# Patient Record
Sex: FEMALE | Race: WHITE | URBAN - METROPOLITAN AREA
[De-identification: names, ages, dates, MRNs, and addresses within clinical notes are randomized per-mention and may not be internally consistent; named-entity substitution may affect disease eponyms.]

---

## 2019-02-07 ENCOUNTER — EMERGENCY (EMERGENCY)
Facility: HOSPITAL | Age: 54
LOS: 0 days | Discharge: HOME | End: 2019-02-07
Attending: EMERGENCY MEDICINE | Admitting: EMERGENCY MEDICINE

## 2019-02-07 VITALS
RESPIRATION RATE: 18 BRPM | HEART RATE: 72 BPM | TEMPERATURE: 97 F | OXYGEN SATURATION: 99 % | SYSTOLIC BLOOD PRESSURE: 135 MMHG | DIASTOLIC BLOOD PRESSURE: 79 MMHG

## 2019-02-07 VITALS
DIASTOLIC BLOOD PRESSURE: 63 MMHG | TEMPERATURE: 98 F | RESPIRATION RATE: 20 BRPM | HEART RATE: 61 BPM | HEIGHT: 68 IN | SYSTOLIC BLOOD PRESSURE: 141 MMHG | WEIGHT: 179.9 LBS | OXYGEN SATURATION: 99 %

## 2019-02-07 DIAGNOSIS — R06.02 SHORTNESS OF BREATH: ICD-10-CM

## 2019-02-07 DIAGNOSIS — E03.9 HYPOTHYROIDISM, UNSPECIFIED: ICD-10-CM

## 2019-02-07 DIAGNOSIS — E78.5 HYPERLIPIDEMIA, UNSPECIFIED: ICD-10-CM

## 2019-02-07 DIAGNOSIS — R07.9 CHEST PAIN, UNSPECIFIED: ICD-10-CM

## 2019-02-07 DIAGNOSIS — Z88.0 ALLERGY STATUS TO PENICILLIN: ICD-10-CM

## 2019-02-07 DIAGNOSIS — R07.89 OTHER CHEST PAIN: ICD-10-CM

## 2019-02-07 LAB
ALBUMIN SERPL ELPH-MCNC: 4.2 G/DL — SIGNIFICANT CHANGE UP (ref 3.5–5.2)
ALP SERPL-CCNC: 88 U/L — SIGNIFICANT CHANGE UP (ref 30–115)
ALT FLD-CCNC: 36 U/L — SIGNIFICANT CHANGE UP (ref 0–41)
ANION GAP SERPL CALC-SCNC: 9 MMOL/L — SIGNIFICANT CHANGE UP (ref 7–14)
AST SERPL-CCNC: 24 U/L — SIGNIFICANT CHANGE UP (ref 0–41)
BASOPHILS # BLD AUTO: 0.05 K/UL — SIGNIFICANT CHANGE UP (ref 0–0.2)
BASOPHILS NFR BLD AUTO: 0.7 % — SIGNIFICANT CHANGE UP (ref 0–1)
BILIRUB SERPL-MCNC: 0.2 MG/DL — SIGNIFICANT CHANGE UP (ref 0.2–1.2)
BUN SERPL-MCNC: 15 MG/DL — SIGNIFICANT CHANGE UP (ref 10–20)
CALCIUM SERPL-MCNC: 9.1 MG/DL — SIGNIFICANT CHANGE UP (ref 8.5–10.1)
CHLORIDE SERPL-SCNC: 101 MMOL/L — SIGNIFICANT CHANGE UP (ref 98–110)
CO2 SERPL-SCNC: 32 MMOL/L — SIGNIFICANT CHANGE UP (ref 17–32)
CREAT SERPL-MCNC: 1.1 MG/DL — SIGNIFICANT CHANGE UP (ref 0.7–1.5)
D DIMER BLD IA.RAPID-MCNC: 135 NG/ML DDU — SIGNIFICANT CHANGE UP (ref 0–230)
EOSINOPHIL # BLD AUTO: 0.18 K/UL — SIGNIFICANT CHANGE UP (ref 0–0.7)
EOSINOPHIL NFR BLD AUTO: 2.6 % — SIGNIFICANT CHANGE UP (ref 0–8)
GLUCOSE SERPL-MCNC: 89 MG/DL — SIGNIFICANT CHANGE UP (ref 70–99)
HCT VFR BLD CALC: 41.5 % — SIGNIFICANT CHANGE UP (ref 37–47)
HGB BLD-MCNC: 13.7 G/DL — SIGNIFICANT CHANGE UP (ref 12–16)
IMM GRANULOCYTES NFR BLD AUTO: 0.1 % — SIGNIFICANT CHANGE UP (ref 0.1–0.3)
LIDOCAIN IGE QN: 37 U/L — SIGNIFICANT CHANGE UP (ref 7–60)
LYMPHOCYTES # BLD AUTO: 3.37 K/UL — SIGNIFICANT CHANGE UP (ref 1.2–3.4)
LYMPHOCYTES # BLD AUTO: 47.8 % — SIGNIFICANT CHANGE UP (ref 20.5–51.1)
MCHC RBC-ENTMCNC: 32.1 PG — HIGH (ref 27–31)
MCHC RBC-ENTMCNC: 33 G/DL — SIGNIFICANT CHANGE UP (ref 32–37)
MCV RBC AUTO: 97.2 FL — SIGNIFICANT CHANGE UP (ref 81–99)
MONOCYTES # BLD AUTO: 0.69 K/UL — HIGH (ref 0.1–0.6)
MONOCYTES NFR BLD AUTO: 9.8 % — HIGH (ref 1.7–9.3)
NEUTROPHILS # BLD AUTO: 2.75 K/UL — SIGNIFICANT CHANGE UP (ref 1.4–6.5)
NEUTROPHILS NFR BLD AUTO: 39 % — LOW (ref 42.2–75.2)
NRBC # BLD: 0 /100 WBCS — SIGNIFICANT CHANGE UP (ref 0–0)
PLATELET # BLD AUTO: 179 K/UL — SIGNIFICANT CHANGE UP (ref 130–400)
POTASSIUM SERPL-MCNC: 4.2 MMOL/L — SIGNIFICANT CHANGE UP (ref 3.5–5)
POTASSIUM SERPL-SCNC: 4.2 MMOL/L — SIGNIFICANT CHANGE UP (ref 3.5–5)
PROT SERPL-MCNC: 6.7 G/DL — SIGNIFICANT CHANGE UP (ref 6–8)
RBC # BLD: 4.27 M/UL — SIGNIFICANT CHANGE UP (ref 4.2–5.4)
RBC # FLD: 12.7 % — SIGNIFICANT CHANGE UP (ref 11.5–14.5)
SODIUM SERPL-SCNC: 142 MMOL/L — SIGNIFICANT CHANGE UP (ref 135–146)
TROPONIN T SERPL-MCNC: <0.01 NG/ML — SIGNIFICANT CHANGE UP
TROPONIN T SERPL-MCNC: <0.01 NG/ML — SIGNIFICANT CHANGE UP
WBC # BLD: 7.05 K/UL — SIGNIFICANT CHANGE UP (ref 4.8–10.8)
WBC # FLD AUTO: 7.05 K/UL — SIGNIFICANT CHANGE UP (ref 4.8–10.8)

## 2019-02-07 NOTE — ED PROVIDER NOTE - CARE PROVIDER_API CALL
Walker Spain)  Cardiovascular Disease; Internal Medicine  [address] [place] [id number]  Phone: [phone]  Fax: [phone]  Follow Up Time:

## 2019-02-07 NOTE — ED PROVIDER NOTE - PHYSICAL EXAMINATION
Alert well apperaing, CVS rrr, 2+ radial pulse b/l equal ,  RESP cta no w/r/r,  abd soft nontender no pulsatile abdominal mass,  no le edema.

## 2019-02-07 NOTE — ED PROVIDER NOTE - NS ED ROS FT
Constitutional:  No fever, chills, lethargy, or abnormal weight loss  Eyes:  No eye pain or visual changes  ENMT: No nasal discharge, no toothache, no sore throat. No neck pain or stiffness  Cardiac:  hpi  Respiratory:  No cough or respiratory distress.   GI:  No nausea, vomiting, diarrhea or abdominal pain.  :  No dysuria, frequency or burning.  MS:  No back or joint pain.  Neuro:  No headache. No numbness, weakness, or tingling.   Skin:  No skin rash  Except as documented in the HPI,  all other systems are negative

## 2019-02-07 NOTE — ED PROVIDER NOTE - OBJECTIVE STATEMENT
53yF pmhx HLD, hypothryoid,  pancreatitis  p/w chest pain retrosternal  x 2 weeks intermittnet -  started when she was driving back from [place]  - ,  over  past  4 days  has felt like a pressure/ lump  in her chest - - this morning -  3am  got worse -  occasional SOB - no syncope no leg pain swelling non smoker no cardiac hx no hx of  dvt pe

## 2019-02-07 NOTE — ED PROVIDER NOTE - INTERPRETATION
normal sinus rhythm, Normal axis, Normal CT interval and QRS complex. There are no acute ischemic ST or T-wave changes./sinus

## 2019-02-07 NOTE — ED PROVIDER NOTE - MEDICAL DECISION MAKING DETAILS
Pt with intermittent CP as above, feels it might be related to food, PE unremarkable, labs and studies reviewed, will d/c to f/u with pmd/cardio. Patient counseled regarding conditions which should prompt return.

## 2019-04-11 ENCOUNTER — EMERGENCY (EMERGENCY)
Facility: HOSPITAL | Age: 54
LOS: 0 days | Discharge: HOME | End: 2019-04-12
Attending: EMERGENCY MEDICINE | Admitting: EMERGENCY MEDICINE
Payer: MEDICAID

## 2019-04-11 VITALS
WEIGHT: 175.05 LBS | SYSTOLIC BLOOD PRESSURE: 140 MMHG | HEIGHT: 66 IN | HEART RATE: 101 BPM | DIASTOLIC BLOOD PRESSURE: 96 MMHG | TEMPERATURE: 96 F | OXYGEN SATURATION: 99 % | RESPIRATION RATE: 20 BRPM

## 2019-04-11 DIAGNOSIS — Z88.0 ALLERGY STATUS TO PENICILLIN: ICD-10-CM

## 2019-04-11 DIAGNOSIS — R10.9 UNSPECIFIED ABDOMINAL PAIN: ICD-10-CM

## 2019-04-11 DIAGNOSIS — I25.2 OLD MYOCARDIAL INFARCTION: ICD-10-CM

## 2019-04-11 DIAGNOSIS — Z79.899 OTHER LONG TERM (CURRENT) DRUG THERAPY: ICD-10-CM

## 2019-04-11 DIAGNOSIS — Z86.73 PERSONAL HISTORY OF TRANSIENT ISCHEMIC ATTACK (TIA), AND CEREBRAL INFARCTION WITHOUT RESIDUAL DEFICITS: ICD-10-CM

## 2019-04-11 DIAGNOSIS — N12 TUBULO-INTERSTITIAL NEPHRITIS, NOT SPECIFIED AS ACUTE OR CHRONIC: ICD-10-CM

## 2019-04-11 DIAGNOSIS — R56.9 UNSPECIFIED CONVULSIONS: ICD-10-CM

## 2019-04-11 DIAGNOSIS — Z90.49 ACQUIRED ABSENCE OF OTHER SPECIFIED PARTS OF DIGESTIVE TRACT: ICD-10-CM

## 2019-04-11 PROCEDURE — 99284 EMERGENCY DEPT VISIT MOD MDM: CPT | Mod: 25

## 2019-04-11 NOTE — ED ADULT TRIAGE NOTE - CHIEF COMPLAINT QUOTE
Patient states "I have 2 ovarian cysts and I think it might have burst. The pain is in my stomach going to my back for the past 2 hours."

## 2019-04-11 NOTE — ED ADULT NURSE NOTE - NSIMPLEMENTINTERV_GEN_ALL_ED
Implemented All Universal Safety Interventions:  [place] to call system. Call bell, personal items and telephone within reach. Instruct patient to call for assistance. Room bathroom lighting operational. Non-slip footwear when patient is off stretcher. Physically safe environment: no spills, clutter or unnecessary equipment. Stretcher in lowest position, wheels locked, appropriate side rails in place.

## 2019-04-11 NOTE — ED ADULT NURSE REASSESSMENT NOTE - NS ED NURSE REASSESS COMMENT FT1
Pt received from RN Rayna, alert and oriented times three, breathing spontaneous, unlabored without distress on room air, diffuse abd pain, epigastric burning and nausea, awaits MD hess

## 2019-04-12 VITALS
TEMPERATURE: 97 F | DIASTOLIC BLOOD PRESSURE: 73 MMHG | HEART RATE: 63 BPM | RESPIRATION RATE: 18 BRPM | OXYGEN SATURATION: 99 % | SYSTOLIC BLOOD PRESSURE: 100 MMHG

## 2019-04-12 PROBLEM — K80.00 CALCULUS OF GALLBLADDER WITH ACUTE CHOLECYSTITIS WITHOUT OBSTRUCTION: Chronic | Status: ACTIVE | Noted: 2019-02-07

## 2019-04-12 PROBLEM — K85.90 ACUTE PANCREATITIS WITHOUT NECROSIS OR INFECTION, UNSPECIFIED: Chronic | Status: ACTIVE | Noted: 2019-02-07

## 2019-04-12 PROBLEM — R56.9 UNSPECIFIED CONVULSIONS: Chronic | Status: ACTIVE | Noted: 2019-02-07

## 2019-04-12 LAB
ALBUMIN SERPL ELPH-MCNC: 4.5 G/DL — SIGNIFICANT CHANGE UP (ref 3.5–5.2)
ALP SERPL-CCNC: 92 U/L — SIGNIFICANT CHANGE UP (ref 30–115)
ALT FLD-CCNC: 46 U/L — HIGH (ref 0–41)
ANION GAP SERPL CALC-SCNC: 13 MMOL/L — SIGNIFICANT CHANGE UP (ref 7–14)
APPEARANCE UR: ABNORMAL
AST SERPL-CCNC: 37 U/L — SIGNIFICANT CHANGE UP (ref 0–41)
BACTERIA # UR AUTO: ABNORMAL
BILIRUB SERPL-MCNC: <0.2 MG/DL — SIGNIFICANT CHANGE UP (ref 0.2–1.2)
BILIRUB UR-MCNC: NEGATIVE — SIGNIFICANT CHANGE UP
BUN SERPL-MCNC: 17 MG/DL — SIGNIFICANT CHANGE UP (ref 10–20)
CALCIUM SERPL-MCNC: 9.2 MG/DL — SIGNIFICANT CHANGE UP (ref 8.5–10.1)
CHLORIDE SERPL-SCNC: 104 MMOL/L — SIGNIFICANT CHANGE UP (ref 98–110)
CO2 SERPL-SCNC: 22 MMOL/L — SIGNIFICANT CHANGE UP (ref 17–32)
COD CRY URNS QL: NEGATIVE — SIGNIFICANT CHANGE UP
COLOR SPEC: ABNORMAL
CREAT SERPL-MCNC: 1.2 MG/DL — SIGNIFICANT CHANGE UP (ref 0.7–1.5)
DIFF PNL FLD: ABNORMAL
EPI CELLS # UR: ABNORMAL /HPF
GLUCOSE SERPL-MCNC: 196 MG/DL — HIGH (ref 70–99)
GLUCOSE UR QL: 100 MG/DL
GRAN CASTS # UR COMP ASSIST: NEGATIVE — SIGNIFICANT CHANGE UP
HCT VFR BLD CALC: 40.1 % — SIGNIFICANT CHANGE UP (ref 37–47)
HGB BLD-MCNC: 13.2 G/DL — SIGNIFICANT CHANGE UP (ref 12–16)
HYALINE CASTS # UR AUTO: NEGATIVE — SIGNIFICANT CHANGE UP
KETONES UR-MCNC: NEGATIVE — SIGNIFICANT CHANGE UP
LEUKOCYTE ESTERASE UR-ACNC: SIGNIFICANT CHANGE UP
LIDOCAIN IGE QN: 187 U/L — HIGH (ref 7–60)
MCHC RBC-ENTMCNC: 32.1 PG — HIGH (ref 27–31)
MCHC RBC-ENTMCNC: 32.9 G/DL — SIGNIFICANT CHANGE UP (ref 32–37)
MCV RBC AUTO: 97.6 FL — SIGNIFICANT CHANGE UP (ref 81–99)
NITRITE UR-MCNC: POSITIVE
NRBC # BLD: 0 /100 WBCS — SIGNIFICANT CHANGE UP (ref 0–0)
NT-PROBNP SERPL-SCNC: <5 PG/ML — SIGNIFICANT CHANGE UP (ref 0–300)
PH UR: 6.5 — SIGNIFICANT CHANGE UP (ref 5–8)
PLATELET # BLD AUTO: 233 K/UL — SIGNIFICANT CHANGE UP (ref 130–400)
POTASSIUM SERPL-MCNC: 4.3 MMOL/L — SIGNIFICANT CHANGE UP (ref 3.5–5)
POTASSIUM SERPL-SCNC: 4.3 MMOL/L — SIGNIFICANT CHANGE UP (ref 3.5–5)
PROT SERPL-MCNC: 7.3 G/DL — SIGNIFICANT CHANGE UP (ref 6–8)
PROT UR-MCNC: 30 MG/DL
RBC # BLD: 4.11 M/UL — LOW (ref 4.2–5.4)
RBC # FLD: 12.8 % — SIGNIFICANT CHANGE UP (ref 11.5–14.5)
RBC CASTS # UR COMP ASSIST: ABNORMAL /HPF
SODIUM SERPL-SCNC: 139 MMOL/L — SIGNIFICANT CHANGE UP (ref 135–146)
SP GR SPEC: 1.01 — SIGNIFICANT CHANGE UP (ref 1.01–1.03)
TRI-PHOS CRY UR QL COMP ASSIST: NEGATIVE — SIGNIFICANT CHANGE UP
TROPONIN T SERPL-MCNC: <0.01 NG/ML — SIGNIFICANT CHANGE UP
URATE CRY FLD QL MICRO: NEGATIVE — SIGNIFICANT CHANGE UP
UROBILINOGEN FLD QL: 1 MG/DL (ref 0.2–0.2)
WBC # BLD: 9.19 K/UL — SIGNIFICANT CHANGE UP (ref 4.8–10.8)
WBC # FLD AUTO: 9.19 K/UL — SIGNIFICANT CHANGE UP (ref 4.8–10.8)
WBC UR QL: ABNORMAL /HPF

## 2019-04-12 PROCEDURE — 74176 CT ABD & PELVIS W/O CONTRAST: CPT | Mod: 26

## 2019-04-12 RX ORDER — KETOROLAC TROMETHAMINE 30 MG/ML
15 SYRINGE (ML) INJECTION ONCE
Qty: 0 | Refills: 0 | Status: DISCONTINUED | OUTPATIENT
Start: 2019-04-12 | End: 2019-04-12

## 2019-04-12 RX ORDER — CEFTRIAXONE 500 MG/1
1 INJECTION, POWDER, FOR SOLUTION INTRAMUSCULAR; INTRAVENOUS ONCE
Qty: 0 | Refills: 0 | Status: COMPLETED | OUTPATIENT
Start: 2019-04-12 | End: 2019-04-12

## 2019-04-12 RX ORDER — SODIUM CHLORIDE 9 MG/ML
1000 INJECTION, SOLUTION INTRAVENOUS ONCE
Qty: 0 | Refills: 0 | Status: COMPLETED | OUTPATIENT
Start: 2019-04-12 | End: 2019-04-12

## 2019-04-12 RX ORDER — CIPROFLOXACIN LACTATE 400MG/40ML
1 VIAL (ML) INTRAVENOUS
Qty: 20 | Refills: 0
Start: 2019-04-12 | End: 2019-04-21

## 2019-04-12 RX ORDER — ONDANSETRON 8 MG/1
4 TABLET, FILM COATED ORAL ONCE
Qty: 0 | Refills: 0 | Status: COMPLETED | OUTPATIENT
Start: 2019-04-12 | End: 2019-04-12

## 2019-04-12 RX ORDER — FAMOTIDINE 10 MG/ML
20 INJECTION INTRAVENOUS ONCE
Qty: 0 | Refills: 0 | Status: COMPLETED | OUTPATIENT
Start: 2019-04-12 | End: 2019-04-12

## 2019-04-12 RX ADMIN — SODIUM CHLORIDE 1000 MILLILITER(S): 9 INJECTION, SOLUTION INTRAVENOUS at 00:33

## 2019-04-12 RX ADMIN — FAMOTIDINE 20 MILLIGRAM(S): 10 INJECTION INTRAVENOUS at 00:36

## 2019-04-12 RX ADMIN — ONDANSETRON 4 MILLIGRAM(S): 8 TABLET, FILM COATED ORAL at 00:34

## 2019-04-12 RX ADMIN — CEFTRIAXONE 1 GRAM(S): 500 INJECTION, POWDER, FOR SOLUTION INTRAMUSCULAR; INTRAVENOUS at 05:10

## 2019-04-12 RX ADMIN — SODIUM CHLORIDE 1000 MILLILITER(S): 9 INJECTION, SOLUTION INTRAVENOUS at 01:30

## 2019-04-12 RX ADMIN — CEFTRIAXONE 100 GRAM(S): 500 INJECTION, POWDER, FOR SOLUTION INTRAMUSCULAR; INTRAVENOUS at 04:47

## 2019-04-12 RX ADMIN — Medication 15 MILLIGRAM(S): at 01:10

## 2019-04-12 RX ADMIN — Medication 15 MILLIGRAM(S): at 00:34

## 2019-04-12 NOTE — ED PROVIDER NOTE - NSFOLLOWUPCLINICS_GEN_ALL_ED_FT
[facility]  Urology  .  [place]   Phone: [phone]  Fax:   Follow Up Time: 7-10 Days

## 2019-04-12 NOTE — ED PROVIDER NOTE - OBJECTIVE STATEMENT
53yF with PMH TIA, silent MI X 4 per patient report, follows with Dr. Casarez, [facility] cholecystectomy, all PCN (swelling), p/w sudden onset aching moderate/severe suprapubic abd pain rad to both flanks a/w inability to urinate and burning sensation. pt took 1 dose of macrobid without relief and drank a bottle of cranberry juice with subsequent epigastric burning pain radiating up the sternum pt describes as heartburn. pt also has hx ovarian cyst bilaterally. +nausea

## 2019-04-12 NOTE — ED PROVIDER NOTE - ATTENDING CONTRIBUTION TO CARE
I personally evaluated the patient. I reviewed the Resident’s or Physician Assistant’s note (as assigned above), and agree with the findings and plan except as documented in my note.    53yF with PMH TIA, MI X 4 per patient report, follows with Dr. Casarez, [facility] cholecystectomy presents w lower abd plain and left sided flank pain. + dysuria for 3 days. No n/v. No CP, SOB.     CONSTITUTIONAL: Well-developed; well-nourished; in no acute distress. Sitting up and providing appropriate history and physical examination  SKIN: skin exam is warm and dry, no acute rash.  HEAD: Normocephalic; atraumatic.  EYES: PERRL, 3 mm bilateral, no nystagmus, EOM intact; conjunctiva and sclera clear.  ENT: No nasal discharge; airway clear.  NECK: Supple; non tender.+ full passive ROM in all directions. No JVD  CARD: S1, S2 normal; no murmurs, gallops, or rubs. Regular rate and rhythm. + Symmetric Strong Pulses  RESP: No wheezes, rales or rhonchi. Good air movement bilaterally  ABD: soft; non-distended; non-tender. No Rebound, No Gaurding, No signs of peritnitis, No CVA tenderness  EXT: Normal ROM. No clubbing, cyanosis or edema. Dp and Pt Pulses intact. Cap refill less than 3 seconds  NEURO: CN 2-12 intact, normal finger to nose, normal romberg, stable gait, no sensory or motor deficits, Alert, oriented, grossly unremarkable. No Focal deficits. GCS 15. NIH 0  PSYCH: Cooperative, appropriate.

## 2019-04-12 NOTE — ED PROVIDER NOTE - CLINICAL SUMMARY MEDICAL DECISION MAKING FREE TEXT BOX
abx ordered. pt well appearing on exam. will dc w pmd f/u Full DC instructions discussed and patient knows when to seek immediate medical attention. Patient has proper follow-up. All results discussed with the patient they may require further work-up. Limitations of ED work-up discussed. All  questions and concerns from patient or family addressed. Understanding of insturctions verbalized

## 2019-04-12 NOTE — ED PROVIDER NOTE - PHYSICAL EXAMINATION
Vital Signs: I have reviewed the initial vital signs.  Constitutional: NAD, well-nourished, appears stated age, no acute distress.  HEENT: Airway patent, moist MM, no erythema/swelling/deformity of oral structures. EOMI, PERRLA.  CV: regular rate, regular rhythm, well-perfused extremities, 2+ b/l DP and radial pulses equal.  Lungs: BCTA, no increased WOB.  ABD: discomofrt with epigastric and suprapubic palpation otherwise NTND, no guarding or rebound, no pulsatile mass, no hernias.   MSK: Neck supple, nontender, nl ROM, no stepoff. Chest nontender. Back nontender in TLS spine or to b/l bony structures. +BL CVA ttp. Ext nontender, nl rom, no deformity.   INTEG: Skin warm, dry, no rash.  NEURO: A&Ox3, moving all extremities, normal speech  PSYCH: Calm, cooperative, normal affect and interaction.

## 2019-04-12 NOTE — ED PROVIDER NOTE - NS ED ROS FT
Review of Systems    Constitutional: (-) fever  Cardiovascular: (-) chest pain, (-) syncope  Respiratory: (-) cough, (-) shortness of breath  Gastrointestinal: (-) vomiting, (-) diarrhea, (+) abdominal pain  Musculoskeletal: (-) neck pain, (+) back pain, (-) joint pain  Integumentary: (-) rash, (-) edema  Neurological: (-) headache, (-) altered mental status    Except as documented in the HPI, all other systems are negative.

## 2019-04-30 NOTE — ED PROVIDER NOTE - NS ED ATTENDING STATEMENT MOD
I have personally seen and examined this patient.  I have fully participated in the care of this patient. I have reviewed all pertinent clinical information, including history, physical exam, plan and the Resident’s note and agree except as noted. None

## 2019-05-20 ENCOUNTER — TRANSCRIPTION ENCOUNTER (OUTPATIENT)
Age: 54
End: 2019-05-20

## 2019-09-12 ENCOUNTER — TRANSCRIPTION ENCOUNTER (OUTPATIENT)
Age: 54
End: 2019-09-12

## 2019-09-14 ENCOUNTER — EMERGENCY (EMERGENCY)
Facility: HOSPITAL | Age: 54
LOS: 0 days | Discharge: HOME | End: 2019-09-14
Attending: EMERGENCY MEDICINE | Admitting: EMERGENCY MEDICINE
Payer: MEDICAID

## 2019-09-14 VITALS
DIASTOLIC BLOOD PRESSURE: 91 MMHG | SYSTOLIC BLOOD PRESSURE: 133 MMHG | HEART RATE: 85 BPM | OXYGEN SATURATION: 98 % | TEMPERATURE: 97 F | RESPIRATION RATE: 19 BRPM

## 2019-09-14 DIAGNOSIS — Z88.0 ALLERGY STATUS TO PENICILLIN: ICD-10-CM

## 2019-09-14 DIAGNOSIS — R39.89 OTHER SYMPTOMS AND SIGNS INVOLVING THE GENITOURINARY SYSTEM: ICD-10-CM

## 2019-09-14 DIAGNOSIS — N39.0 URINARY TRACT INFECTION, SITE NOT SPECIFIED: ICD-10-CM

## 2019-09-14 LAB
APPEARANCE UR: CLEAR — SIGNIFICANT CHANGE UP
BACTERIA # UR AUTO: ABNORMAL
BILIRUB UR-MCNC: ABNORMAL
COLOR SPEC: SIGNIFICANT CHANGE UP
DIFF PNL FLD: NEGATIVE — SIGNIFICANT CHANGE UP
EPI CELLS # UR: ABNORMAL /HPF
GLUCOSE UR QL: 100 MG/DL
KETONES UR-MCNC: NEGATIVE — SIGNIFICANT CHANGE UP
LEUKOCYTE ESTERASE UR-ACNC: NEGATIVE — SIGNIFICANT CHANGE UP
NITRITE UR-MCNC: POSITIVE
PH UR: 5 — SIGNIFICANT CHANGE UP (ref 5–8)
PROT UR-MCNC: 30 MG/DL
SP GR SPEC: 1.01 — SIGNIFICANT CHANGE UP (ref 1.01–1.03)
UROBILINOGEN FLD QL: 2 MG/DL (ref 0.2–0.2)
WBC UR QL: SIGNIFICANT CHANGE UP /HPF

## 2019-09-14 PROCEDURE — 99284 EMERGENCY DEPT VISIT MOD MDM: CPT

## 2019-09-14 RX ORDER — MOXIFLOXACIN HYDROCHLORIDE TABLETS, 400 MG 400 MG/1
1 TABLET, FILM COATED ORAL
Qty: 14 | Refills: 0
Start: 2019-09-14 | End: 2019-09-20

## 2019-09-14 RX ORDER — DIPHENHYDRAMINE HCL 50 MG
25 CAPSULE ORAL ONCE
Refills: 0 | Status: COMPLETED | OUTPATIENT
Start: 2019-09-14 | End: 2019-09-14

## 2019-09-14 RX ORDER — CEFTRIAXONE 500 MG/1
1000 INJECTION, POWDER, FOR SOLUTION INTRAMUSCULAR; INTRAVENOUS ONCE
Refills: 0 | Status: COMPLETED | OUTPATIENT
Start: 2019-09-14 | End: 2019-09-14

## 2019-09-14 RX ORDER — KETOROLAC TROMETHAMINE 30 MG/ML
15 SYRINGE (ML) INJECTION ONCE
Refills: 0 | Status: DISCONTINUED | OUTPATIENT
Start: 2019-09-14 | End: 2019-09-14

## 2019-09-14 RX ADMIN — Medication 15 MILLIGRAM(S): at 09:11

## 2019-09-14 RX ADMIN — CEFTRIAXONE 100 MILLIGRAM(S): 500 INJECTION, POWDER, FOR SOLUTION INTRAMUSCULAR; INTRAVENOUS at 09:11

## 2019-09-14 RX ADMIN — Medication 25 MILLIGRAM(S): at 09:41

## 2019-09-14 NOTE — ED ADULT NURSE NOTE - NSIMPLEMENTINTERV_GEN_ALL_ED
Implemented All Universal Safety Interventions:  [place] to call system. Call bell, personal items and telephone within reach. Instruct patient to call for assistance. Room bathroom lighting operational. Non-slip footwear when patient is off stretcher. Physically safe environment: no spills, clutter or unnecessary equipment. Stretcher in lowest position, wheels locked, appropriate side rails in place.

## 2019-09-14 NOTE — ED PROVIDER NOTE - ATTENDING CONTRIBUTION TO CARE
I personally evaluated patient. I agree with the findings and plan with all documentation on chart except as documented  in my note.    54 y/o F PMH hypothyroid, recurrent UTI c/o UTI symptoms for 1 week. Patient states symptoms started after being powdered and waxed. Symptoms got worse after having sex for the first time in 5 years. + protection use . + dysuria and frequency. + lower abdomen and right back pains. No fever or chills. Patient went to [facility] and was started on Macrobid and Pyridium without improvement.    On exam, VS reviewed. Patient has no CVA tenderness and is non-toxic appearing.  Patient spoken to and plan made to treat with one dose of Ceftriaxone and to DC on Cipro.  This combination has worked multiple times in the past. Shared decision making utilized and will DC with PO antibiotics and strict return precautions.    Full DC instructions discussed and patient knows when to seek immediate medical attention.  Patient has proper follow up.  All results discussed and patient aware they may require further work up.  Proper follow up ensured. Limitations of ED work up discussed.  Medications administered and prescribed/OTC home meds discussed.  All questions and concerns from patient or family addressed. Understanding of instructions verbalized.

## 2019-09-14 NOTE — ED PROVIDER NOTE - CLINICAL SUMMARY MEDICAL DECISION MAKING FREE TEXT BOX
On exam, VS reviewed. Patient has no CVA tenderness and is non-toxic appearing.  Patient spoken to and plan made to treat with one dose of Ceftriaxone and to DC on Cipro.  This combination has worked multiple times in the past. Shared decision making utilized and will DC with PO antibiotics and strict return precautions.    Full DC instructions discussed and patient knows when to seek immediate medical attention.  Patient has proper follow up.  All results discussed and patient aware they may require further work up.  Proper follow up ensured. Limitations of ED work up discussed.  Medications administered and prescribed/OTC home meds discussed.  All questions and concerns from patient or family addressed. Understanding of instructions verbalized.

## 2019-09-14 NOTE — ED PROVIDER NOTE - OBJECTIVE STATEMENT
52 yo F hx of hypothyroid, recurrent UTI c/o UTI symptoms for 1 week. Patient states symptoms started after being powdered and waxed. Symptoms got worse after having sex for the first time in 5 years. + protection use . + dysuria and frequency. + lower abdomen and right back pains. No fever or chills. Patient went to [facility] and was started on Macrobid and Pyridium without improvement.

## 2019-09-14 NOTE — ED PROVIDER NOTE - PROGRESS NOTE DETAILS
Patient refused pelvic exam. Denies vaginal discharge Patient had rash after Ceftriaxone, Benadryl given with improvement. No rash now. Patient reports she has hx of ovarian cyst. Pelvic sono offered, refused. She will f/u with her PMD/GYN

## 2019-09-14 NOTE — ED PROVIDER NOTE - PHYSICAL EXAMINATION
Gen: Alert, NAD, well appearing  Head: NC, AT, PERRL, EOMI, normal lids/conjunctiva  ENT: normal hearing, patent oropharynx   Neck: +supple, no tenderness/meningismus,  Pulm: Bilateral BS, normal resp effort, no wheeze/stridor/retractions  CV: RRR, no murmer  Abd: soft, +right CVA and suprapubic discomfort  Mskel: no edema/erythema/cyanosis  Skin: no rash, warm/dry  Neuro: AAOx3, no sensory/motor deficits

## 2019-09-14 NOTE — ED PROVIDER NOTE - NS ED ROS FT
Review of Systems    Constitutional: (-) fever, (-) chills  Eyes/ENT: (-) blurry vision, (-) epistaxis, (-) sore throat  Cardiovascular: (-) chest pain, (-) syncope  Respiratory: (-) cough, (-) shortness of breath  Gastrointestinal: (+) pain, (+) nausea, (-) vomiting, (-) diarrhea  Musculoskeletal: (-) neck pain, (+) back pain, (-) joint pain  Integumentary: (-) rash, (-) edema  Neurological: (-) headache, (-) altered mental status

## 2019-09-14 NOTE — ED PROVIDER NOTE - PATIENT PORTAL LINK FT
You can access the FollowMyHealth Patient Portal offered by [facility] by registering at the following website: [email or web address]. By joining [employer]’s FollowMyHealth portal, you will also be able to view your health information using other applications (apps) compatible with our system.

## 2019-09-15 LAB
CULTURE RESULTS: NO GROWTH — SIGNIFICANT CHANGE UP
SPECIMEN SOURCE: SIGNIFICANT CHANGE UP

## 2019-09-24 NOTE — ED ADULT NURSE NOTE - TEMPLATE LIST FOR HEAD TO TOE ASSESSMENT
Abdominal Pain, N/V/D Topical Clindamycin Counseling: Patient counseled that this medication may cause skin irritation or allergic reactions.  In the event of skin irritation, the patient was advised to reduce the amount of the drug applied or use it less frequently.   The patient verbalized understanding of the proper use and possible adverse effects of clindamycin.  All of the patient's questions and concerns were addressed.

## 2019-12-12 PROBLEM — Z00.00 ENCOUNTER FOR PREVENTIVE HEALTH EXAMINATION: Status: ACTIVE | Noted: 2019-12-12

## 2020-05-03 ENCOUNTER — TRANSCRIPTION ENCOUNTER (OUTPATIENT)
Age: 55
End: 2020-05-03

## 2021-02-02 NOTE — ED PROVIDER NOTE - CARE PLAN
[facility] form was placed on [place] NP desk to process
Principal Discharge DX:	Chest pain, unspecified type

## 2021-05-31 NOTE — ED ADULT NURSE NOTE - NSFALLRSKOUTCOME_ED_ALL_ED
Attempted to reschedule phone visit today as SW is out.  Line was busy x2 and then rang the third time with no vm.  Will remove patient from Schedule at this time.  Unsure the reason for appointment. No RN Note in chart to review from 7/25 visit  
Universal Safety Interventions

## 2021-09-18 ENCOUNTER — EMERGENCY (EMERGENCY)
Facility: HOSPITAL | Age: 56
LOS: 0 days | Discharge: HOME | End: 2021-09-18
Attending: EMERGENCY MEDICINE | Admitting: EMERGENCY MEDICINE
Payer: MEDICAID

## 2021-09-18 VITALS
SYSTOLIC BLOOD PRESSURE: 140 MMHG | DIASTOLIC BLOOD PRESSURE: 97 MMHG | OXYGEN SATURATION: 100 % | RESPIRATION RATE: 18 BRPM | HEART RATE: 61 BPM | TEMPERATURE: 98 F

## 2021-09-18 VITALS
HEIGHT: 66 IN | OXYGEN SATURATION: 97 % | RESPIRATION RATE: 18 BRPM | SYSTOLIC BLOOD PRESSURE: 155 MMHG | DIASTOLIC BLOOD PRESSURE: 108 MMHG | TEMPERATURE: 97 F | WEIGHT: 179.9 LBS | HEART RATE: 60 BPM

## 2021-09-18 DIAGNOSIS — I10 ESSENTIAL (PRIMARY) HYPERTENSION: ICD-10-CM

## 2021-09-18 DIAGNOSIS — R00.1 BRADYCARDIA, UNSPECIFIED: ICD-10-CM

## 2021-09-18 DIAGNOSIS — Z20.822 CONTACT WITH AND (SUSPECTED) EXPOSURE TO COVID-19: ICD-10-CM

## 2021-09-18 DIAGNOSIS — Z88.8 ALLERGY STATUS TO OTHER DRUGS, MEDICAMENTS AND BIOLOGICAL SUBSTANCES STATUS: ICD-10-CM

## 2021-09-18 DIAGNOSIS — Z91.013 ALLERGY TO SEAFOOD: ICD-10-CM

## 2021-09-18 DIAGNOSIS — R50.9 FEVER, UNSPECIFIED: ICD-10-CM

## 2021-09-18 DIAGNOSIS — R51.9 HEADACHE, UNSPECIFIED: ICD-10-CM

## 2021-09-18 DIAGNOSIS — Z79.890 HORMONE REPLACEMENT THERAPY: ICD-10-CM

## 2021-09-18 DIAGNOSIS — E11.9 TYPE 2 DIABETES MELLITUS WITHOUT COMPLICATIONS: ICD-10-CM

## 2021-09-18 DIAGNOSIS — R29.810 FACIAL WEAKNESS: ICD-10-CM

## 2021-09-18 DIAGNOSIS — M79.10 MYALGIA, UNSPECIFIED SITE: ICD-10-CM

## 2021-09-18 DIAGNOSIS — Z79.899 OTHER LONG TERM (CURRENT) DRUG THERAPY: ICD-10-CM

## 2021-09-18 DIAGNOSIS — Z88.0 ALLERGY STATUS TO PENICILLIN: ICD-10-CM

## 2021-09-18 DIAGNOSIS — Z87.19 PERSONAL HISTORY OF OTHER DISEASES OF THE DIGESTIVE SYSTEM: ICD-10-CM

## 2021-09-18 DIAGNOSIS — E03.9 HYPOTHYROIDISM, UNSPECIFIED: ICD-10-CM

## 2021-09-18 DIAGNOSIS — Z86.69 PERSONAL HISTORY OF OTHER DISEASES OF THE NERVOUS SYSTEM AND SENSE ORGANS: ICD-10-CM

## 2021-09-18 LAB
ALBUMIN SERPL ELPH-MCNC: 4.7 G/DL — SIGNIFICANT CHANGE UP (ref 3.5–5.2)
ALP SERPL-CCNC: 73 U/L — SIGNIFICANT CHANGE UP (ref 30–115)
ALT FLD-CCNC: 52 U/L — HIGH (ref 0–41)
ANION GAP SERPL CALC-SCNC: 11 MMOL/L — SIGNIFICANT CHANGE UP (ref 7–14)
APPEARANCE UR: CLEAR — SIGNIFICANT CHANGE UP
APTT BLD: 35.1 SEC — SIGNIFICANT CHANGE UP (ref 27–39.2)
AST SERPL-CCNC: 45 U/L — HIGH (ref 0–41)
BASOPHILS # BLD AUTO: 0.08 K/UL — SIGNIFICANT CHANGE UP (ref 0–0.2)
BASOPHILS NFR BLD AUTO: 1.1 % — HIGH (ref 0–1)
BILIRUB SERPL-MCNC: 0.2 MG/DL — SIGNIFICANT CHANGE UP (ref 0.2–1.2)
BILIRUB UR-MCNC: NEGATIVE — SIGNIFICANT CHANGE UP
BUN SERPL-MCNC: 16 MG/DL — SIGNIFICANT CHANGE UP (ref 10–20)
CALCIUM SERPL-MCNC: 9 MG/DL — SIGNIFICANT CHANGE UP (ref 8.5–10.1)
CHLORIDE SERPL-SCNC: 100 MMOL/L — SIGNIFICANT CHANGE UP (ref 98–110)
CO2 SERPL-SCNC: 25 MMOL/L — SIGNIFICANT CHANGE UP (ref 17–32)
COLOR SPEC: YELLOW — SIGNIFICANT CHANGE UP
CREAT SERPL-MCNC: 1 MG/DL — SIGNIFICANT CHANGE UP (ref 0.7–1.5)
DIFF PNL FLD: NEGATIVE — SIGNIFICANT CHANGE UP
EOSINOPHIL # BLD AUTO: 0.21 K/UL — SIGNIFICANT CHANGE UP (ref 0–0.7)
EOSINOPHIL NFR BLD AUTO: 3 % — SIGNIFICANT CHANGE UP (ref 0–8)
GLUCOSE SERPL-MCNC: 93 MG/DL — SIGNIFICANT CHANGE UP (ref 70–99)
GLUCOSE UR QL: NEGATIVE MG/DL — SIGNIFICANT CHANGE UP
HCG SERPL QL: NEGATIVE — SIGNIFICANT CHANGE UP
HCT VFR BLD CALC: 41 % — SIGNIFICANT CHANGE UP (ref 37–47)
HGB BLD-MCNC: 13.7 G/DL — SIGNIFICANT CHANGE UP (ref 12–16)
IMM GRANULOCYTES NFR BLD AUTO: 0.6 % — HIGH (ref 0.1–0.3)
INR BLD: 0.97 RATIO — SIGNIFICANT CHANGE UP (ref 0.65–1.3)
KETONES UR-MCNC: NEGATIVE — SIGNIFICANT CHANGE UP
LEUKOCYTE ESTERASE UR-ACNC: NEGATIVE — SIGNIFICANT CHANGE UP
LYMPHOCYTES # BLD AUTO: 2.71 K/UL — SIGNIFICANT CHANGE UP (ref 1.2–3.4)
LYMPHOCYTES # BLD AUTO: 38.3 % — SIGNIFICANT CHANGE UP (ref 20.5–51.1)
MCHC RBC-ENTMCNC: 33.2 PG — HIGH (ref 27–31)
MCHC RBC-ENTMCNC: 33.4 G/DL — SIGNIFICANT CHANGE UP (ref 32–37)
MCV RBC AUTO: 99.3 FL — HIGH (ref 81–99)
MONOCYTES # BLD AUTO: 0.49 K/UL — SIGNIFICANT CHANGE UP (ref 0.1–0.6)
MONOCYTES NFR BLD AUTO: 6.9 % — SIGNIFICANT CHANGE UP (ref 1.7–9.3)
NEUTROPHILS # BLD AUTO: 3.54 K/UL — SIGNIFICANT CHANGE UP (ref 1.4–6.5)
NEUTROPHILS NFR BLD AUTO: 50.1 % — SIGNIFICANT CHANGE UP (ref 42.2–75.2)
NITRITE UR-MCNC: NEGATIVE — SIGNIFICANT CHANGE UP
NRBC # BLD: 0 /100 WBCS — SIGNIFICANT CHANGE UP (ref 0–0)
PH UR: 6 — SIGNIFICANT CHANGE UP (ref 5–8)
PLATELET # BLD AUTO: 168 K/UL — SIGNIFICANT CHANGE UP (ref 130–400)
POTASSIUM SERPL-MCNC: 4.5 MMOL/L — SIGNIFICANT CHANGE UP (ref 3.5–5)
POTASSIUM SERPL-SCNC: 4.5 MMOL/L — SIGNIFICANT CHANGE UP (ref 3.5–5)
PROT SERPL-MCNC: 6.9 G/DL — SIGNIFICANT CHANGE UP (ref 6–8)
PROT UR-MCNC: NEGATIVE MG/DL — SIGNIFICANT CHANGE UP
PROTHROM AB SERPL-ACNC: 11.2 SEC — SIGNIFICANT CHANGE UP (ref 9.95–12.87)
RAPID RVP RESULT: SIGNIFICANT CHANGE UP
RBC # BLD: 4.13 M/UL — LOW (ref 4.2–5.4)
RBC # FLD: 13.4 % — SIGNIFICANT CHANGE UP (ref 11.5–14.5)
SARS-COV-2 RNA SPEC QL NAA+PROBE: SIGNIFICANT CHANGE UP
SODIUM SERPL-SCNC: 136 MMOL/L — SIGNIFICANT CHANGE UP (ref 135–146)
SP GR SPEC: 1.01 — SIGNIFICANT CHANGE UP (ref 1.01–1.03)
UROBILINOGEN FLD QL: 0.2 MG/DL — SIGNIFICANT CHANGE UP
WBC # BLD: 7.07 K/UL — SIGNIFICANT CHANGE UP (ref 4.8–10.8)
WBC # FLD AUTO: 7.07 K/UL — SIGNIFICANT CHANGE UP (ref 4.8–10.8)

## 2021-09-18 PROCEDURE — 99285 EMERGENCY DEPT VISIT HI MDM: CPT

## 2021-09-18 PROCEDURE — 70450 CT HEAD/BRAIN W/O DYE: CPT | Mod: 26,MA

## 2021-09-18 PROCEDURE — 93010 ELECTROCARDIOGRAM REPORT: CPT

## 2021-09-18 PROCEDURE — 70491 CT SOFT TISSUE NECK W/DYE: CPT | Mod: 26,MA

## 2021-09-18 PROCEDURE — 71046 X-RAY EXAM CHEST 2 VIEWS: CPT | Mod: 26

## 2021-09-18 RX ORDER — KETOROLAC TROMETHAMINE 30 MG/ML
15 SYRINGE (ML) INJECTION ONCE
Refills: 0 | Status: DISCONTINUED | OUTPATIENT
Start: 2021-09-18 | End: 2021-09-18

## 2021-09-18 RX ORDER — LEVOTHYROXINE SODIUM 125 MCG
1 TABLET ORAL
Qty: 0 | Refills: 0 | DISCHARGE

## 2021-09-18 RX ORDER — SUMATRIPTAN SUCCINATE 4 MG/.5ML
6 INJECTION, SOLUTION SUBCUTANEOUS ONCE
Refills: 0 | Status: COMPLETED | OUTPATIENT
Start: 2021-09-18 | End: 2021-09-18

## 2021-09-18 RX ORDER — SODIUM CHLORIDE 9 MG/ML
2500 INJECTION INTRAMUSCULAR; INTRAVENOUS; SUBCUTANEOUS ONCE
Refills: 0 | Status: COMPLETED | OUTPATIENT
Start: 2021-09-18 | End: 2021-09-18

## 2021-09-18 RX ORDER — DIPHENHYDRAMINE HCL 50 MG
50 CAPSULE ORAL ONCE
Refills: 0 | Status: COMPLETED | OUTPATIENT
Start: 2021-09-18 | End: 2021-09-18

## 2021-09-18 RX ORDER — ZOLPIDEM TARTRATE 10 MG/1
0 TABLET ORAL
Qty: 0 | Refills: 0 | DISCHARGE

## 2021-09-18 RX ORDER — METOCLOPRAMIDE HCL 10 MG
10 TABLET ORAL ONCE
Refills: 0 | Status: COMPLETED | OUTPATIENT
Start: 2021-09-18 | End: 2021-09-18

## 2021-09-18 RX ADMIN — Medication 50 MILLIGRAM(S): at 10:25

## 2021-09-18 RX ADMIN — SODIUM CHLORIDE 2500 MILLILITER(S): 9 INJECTION INTRAMUSCULAR; INTRAVENOUS; SUBCUTANEOUS at 10:26

## 2021-09-18 RX ADMIN — SUMATRIPTAN SUCCINATE 6 MILLIGRAM(S): 4 INJECTION, SOLUTION SUBCUTANEOUS at 13:04

## 2021-09-18 RX ADMIN — Medication 15 MILLIGRAM(S): at 10:00

## 2021-09-18 RX ADMIN — Medication 10 MILLIGRAM(S): at 10:25

## 2021-09-18 RX ADMIN — Medication 15 MILLIGRAM(S): at 10:40

## 2021-09-18 NOTE — ED PROVIDER NOTE - NS ED ROS FT
Eyes:  No visual changes, eye pain or discharge.  ENMT:  No hearing changes, pain, discharge or infections. No neck pain or stiffness.  Cardiac:  No chest pain, SOB   Respiratory:  No cough or respiratory distress. No hemoptysis. No history of asthma or RAD.  GI:  No nausea, vomiting, diarrhea or abdominal pain.  :  No dysuria, frequency or burning.  MS:  No myalgia, muscle weakness, joint pain or back pain.  Neuro:  + HA No weakness.  No LOC.  Skin:  No skin rash.   Endocrine: + dm No history of thyroid disease  Except as documented in the HPI,  all other systems are negative.

## 2021-09-18 NOTE — ED ADULT NURSE NOTE - NSICDXPASTMEDICALHX_GEN_ALL_CORE_FT
PAST MEDICAL HISTORY:  Gallbladder calculus with acute cholecystitis     Hypothyroid     Pancreatitis     Seizures

## 2021-09-18 NOTE — ED PROVIDER NOTE - PHYSICAL EXAMINATION
VITAL SIGNS: I have reviewed nursing notes and confirm.  CONSTITUTIONAL: Well-developed; well-nourished; in no acute distress.   SKIN:  skin exam is warm and dry, no acute rash.    HEAD: Normocephalic; atraumatic.  EYES:  conjunctiva and sclera clear.  ENT: No nasal discharge; airway clear.  CARD: S1, S2 normal; no murmurs, gallops, or rubs. Regular rate and rhythm.   RESP: No wheezes, rales or rhonchi.  ABD: Normal bowel sounds; soft; non-distended; non-tender  EXT: Normal ROM.  No clubbing, cyanosis or edema.   NEURO: Alert, oriented, grossly unremarkable

## 2021-09-18 NOTE — ED ADULT NURSE NOTE - NSFALLRSKASSESSDT_ED_ALL_ED
How Severe Is Your Skin Lesion?: moderate Have Your Skin Lesions Been Treated?: not been treated Is This A New Presentation, Or A Follow-Up?: Skin Lesion 18-Sep-2021 09:19

## 2021-09-18 NOTE — ED ADULT NURSE NOTE - NSIMPLEMENTINTERV_GEN_ALL_ED
From: Jimi Esposito  To: Brenton Jones LCSW  Sent: 10/27/2020 8:09 PM CDT  Subject: After-Visit Question    Joe, you mentioned that you would be sending me a link on couples?    Where do I find it?   Implemented All Universal Safety Interventions:  [place] to call system. Call bell, personal items and telephone within reach. Instruct patient to call for assistance. Room bathroom lighting operational. Non-slip footwear when patient is off stretcher. Physically safe environment: no spills, clutter or unnecessary equipment. Stretcher in lowest position, wheels locked, appropriate side rails in place.

## 2021-09-18 NOTE — ED PROVIDER NOTE - CLINICAL SUMMARY MEDICAL DECISION MAKING FREE TEXT BOX
56yo F history of HTN DM Hypothyroidism R sided bells palsy presenting with headache, myalgias, fevers/chills x4d. no photophobia, neck pain/stiffness. No numbness/focal weakness. no other acute complaints. Well appearing, NAD, non toxic. NCAT PERRLA EOMI neck supple non tender normal wob cta bl rrr abdomen s nt nd no rebound no guarding WWPx4 +R facial droop not sparing forehead otherwise neuro non focal. labs imaging reviewed. sp discussion with ent/vascular. Pt feeling improved. PERRLA, neck full ROM, no nuchal rigidity, baseline bells palsy otherwise CN2-12 grossly intact, no sensory or motor deficits throughout, no ataxia, no drift. Rapid alternating intact. Aware of all results, given a copy of all available results, comfortable with discharge and follow-up outpatient, strict return precautions given. Endorses understanding of all of this and aware that they can return at any time for new or concerning symptoms. No further questions or concerns at this time

## 2021-09-18 NOTE — ED PROVIDER NOTE - PROGRESS NOTE DETAILS
seen by vascular, likely artifactual given ct neck negative, pt made aware, will given pt f/u for neurology

## 2021-09-18 NOTE — ED PROVIDER NOTE - CARE PROVIDER_API CALL
Haja Aguayo)  Neurology  [address] [place] [address]  Phone: [phone]  Fax: [phone]  Follow Up Time:     Filemon Correa)  EEGEpilepsy; Neurology  [address] [place] [address], [address]  [place] [address]  Phone: [phone]  Fax: [phone]  Follow Up Time:

## 2021-09-18 NOTE — ED PROVIDER NOTE - PATIENT PORTAL LINK FT
You can access the FollowMyHealth Patient Portal offered by [facility] by registering at the following website: [email or web address]. By joining [email or web address]’s FollowMyHealth portal, you will also be able to view your health information using other applications (apps) compatible with our system.

## 2021-09-18 NOTE — ED PROVIDER NOTE - OBJECTIVE STATEMENT
Pt is a 54y/o female with a pmhx of DM, HTN, Hypothyroidism, bells palsy with residual facial droop,  presents today for eval of diffuse Headache x 4 days with associate jillian aches and subjective fever. Pt denies abd pain, CP, SOB, N/V/D, Neck pain, visual changes.

## 2021-09-18 NOTE — ED ADULT TRIAGE NOTE - CHIEF COMPLAINT QUOTE
As per patient four days ago I started getting a headache all over, then two days I got a fever 102 and I woke up with facial drooping on the right side, and my vision became blurry in both eyes"

## 2021-09-18 NOTE — CHART NOTE - NSCHARTNOTEFT_GEN_A_CORE
Called by [facility] ED PA.  Pt p/w headache, fever TMax 102F.   CTH with finding of "Indeterminant scattered foci of soft tissue air along the bilateral temporalis muscles and skull base, possibly within the bilateral internal carotid arteries."   Pt denies any recent procedure/ trauma.    < from: CT Head No Cont (09.18.21 @ 10:43) >  EXAM:  CT BRAIN        PROCEDURE DATE:  09/18/2021    INTERPRETATION:  CLINICAL INDICATION: Headache.    IMPRESSION:  Indeterminant scattered foci of soft tissue air along the bilateral temporalis muscles and skull base, possibly within the bilateral internal carotid arteries.    --- End of Report ---  GOLDIE GREEN MD; Resident Radiologist  This document has been electronically signed.  JESE FARRIS MD; Attending Radiologist  This document has been electronically signed. Sep 18 2021 11:07AM  < end of copied text >            Imaging reviewed with Dr. Argueta  Recommend Vascular evaluation for finding of air possibly w/in b/l internal carotid arteries.  Recommend CT Neck w/w/o con   d/w [facility] ED team.

## 2021-09-18 NOTE — CONSULT NOTE ADULT - ASSESSMENT
A 56 y/o female with a pmhx of DM, HTN, Hypothyroidism, headache,  bells palsy with residual facial droop,   presented to ED with headache x 4 days. On exam pt has right side bells palsy ,facial droop and decreased sensation unilaterally on right side forehead and cheek. Speech intact.  No focal deficit. CT head showed  Indeterminant scattered foci of soft tissue air along the bilateral temporalis muscles and skull base, possibly within the bilateral internal carotid and CT soft tissue does not show foci air most likely an artifact.    Plan:     No acute vascular surgical intervention   If continues to be a concern to the primary team, consult neurosurgery   Vascular singing off  Case discussed with vascular  fellow Dr. Fletcher who discussed with attending Dr. Garduno

## 2021-09-18 NOTE — CONSULT NOTE ADULT - SUBJECTIVE AND OBJECTIVE BOX
Vascular consult  note    Name  JOSE PATE    A 56 y/o female with a pmhx of DM, HTN, Hypothyroidism, headache,  bells palsy with residual facial droop,   presented to ED with headache x 4 days. Pt report headache started 4 days ago, initially on her right termporal region and now has spread to her whole scalp. Pt reports pain intermittent comes and goes but has progressively worsen. Pt reports similar headaches in the past. Last headache attack was 2014 and at that time she got bells palsy. Pt reports numbness and tingling on her right side of the face above the eyebrow and cheek.  Pt reports had fever 4 days ago 102. Pt reports blurry bilaterally, usually wears glasses. Pt reports light bothers her. Pt denies nausea or vomiting. Pt denies dizziness. Pt denies any focal weakness in upper or lower extremities. Pt denies neck pain.  Pt denies chest pain, shortness of breath, burning with urination, diarrhea.         Vital Signs Last 24 Hrs  T(C): 36.3 (18 Sep 2021 08:59), Max: 36.3 (18 Sep 2021 08:59)  T(F): 97.4 (18 Sep 2021 08:59), Max: 97.4 (18 Sep 2021 08:59)  HR: 60 (18 Sep 2021 08:59) (60 - 60)  BP: 155/108 (18 Sep 2021 08:59) (155/108 - 155/108)  RR: 18 (18 Sep 2021 08:59) (18 - 18)  SpO2: 97% (18 Sep 2021 08:59) (97% - 97%)  ICU Vital Signs Last 24 Hrs      Neurological Exam:   Mental status: Awake, alert and oriented x3.  Recent and remote memory intact.  Naming, repetition and comprehension intact.  Attention/concentration intact.  No dysarthria, Fund of knowledge appropriate.    Cranial nerves:  pupils equally round and reactive to light, visual fields full, no nystagmus, extraocular muscles intact,  face asymmetric, right side droop and right eye ptosis. tongue was midline, sternocleidomastoid/shoulder shrug strength bilaterally 5/5.  Neck  supple position ,  no rigidity.   No temporal side tenderness to palpation or with jaw movement. No scalp tenderness.   Motor:  Normal bulk and tone, strength 5/5 in bilateral upper and lower extremities.  No upper or lower extremities drift.    strength 5/5.  Rapid alternating movements intact and symmetric.   Sensation: right side of face forehead and cheek sensation decreased.   Coordination: No dysmetria on finger-to-nose and heel-to-shin.  No clumsiness.  Reflexes: 2+ in upper and lower extremities, downgoing toes bilaterally        Lab                        13.7   7.07  )-----------( 168      ( 18 Sep 2021 10:30 )             41.0     09-18    136  |  100  |  16  ----------------------------<  93  4.5   |  25  |  1.0    Ca    9.0      18 Sep 2021 10:30    TPro  6.9  /  Alb  4.7  /  TBili  0.2  /  DBili  x   /  AST  45<H>  /  ALT  52<H>  /  AlkPhos  73  09-18    CAPILLARY BLOOD GLUCOSE        LIVER FUNCTIONS - ( 18 Sep 2021 10:30 )  Alb: 4.7 g/dL / Pro: 6.9 g/dL / ALK PHOS: 73 U/L / ALT: 52 U/L / AST: 45 U/L / GGT: x           PT/INR - ( 18 Sep 2021 10:30 )   PT: 11.20 sec;   INR: 0.97 ratio         PTT - ( 18 Sep 2021 10:30 )  PTT:35.1 sec    Radiology  < from: CT Head No Cont (09.18.21 @ 10:43) >  IMPRESSION:    Indeterminant scattered foci of soft tissue air along the bilateral temporalis muscles and skull base, possibly within the bilateral internal carotid arteries.    --- End of Report ---      GOLDIE GREEN MD; Resident Radiologist  This document has been electronically signed.  JESE FARRIS MD; Attending Radiologist  This document has been electronically signed. Sep 18 2021 11:07AM    < end of copied text >      < from: CT Neck Soft Tissue w/ IV Cont (09.18.21 @ 15:00) >  IMPRESSION:    Unremarkable examination. Previously described punctate foci of soft tissue air is not demonstrated on this study.    --- End of Report ---      GOLDIE GREEN MD; Resident Radiologist  This document has been electronically signed.  JESE FARRIS MD; Attending Radiologist    < end of copied text >

## 2021-09-19 PROBLEM — E03.9 HYPOTHYROIDISM, UNSPECIFIED: Chronic | Status: ACTIVE | Noted: 2019-09-14

## 2021-09-22 RX ORDER — NITROFURANTOIN MACROCRYSTAL 50 MG
1 CAPSULE ORAL
Qty: 14 | Refills: 0
Start: 2021-09-22 | End: 2021-09-28

## 2021-09-24 LAB
CULTURE RESULTS: SIGNIFICANT CHANGE UP
CULTURE RESULTS: SIGNIFICANT CHANGE UP
SPECIMEN SOURCE: SIGNIFICANT CHANGE UP
SPECIMEN SOURCE: SIGNIFICANT CHANGE UP

## 2022-03-05 NOTE — ED ADULT NURSE NOTE - CCCP TRG CHIEF CMPLNT
This is a recent snapshot of the patient's [facility] medical record.  For current drug dose and complete information and questions, call [phone] or In Basket pool, [facility] ([id number])  CSN Number:  [id number]       abdominal pain

## 2022-12-26 ENCOUNTER — EMERGENCY (EMERGENCY)
Facility: HOSPITAL | Age: 57
LOS: 0 days | Discharge: HOME | End: 2022-12-26
Attending: EMERGENCY MEDICINE | Admitting: EMERGENCY MEDICINE

## 2022-12-26 VITALS
DIASTOLIC BLOOD PRESSURE: 71 MMHG | SYSTOLIC BLOOD PRESSURE: 124 MMHG | TEMPERATURE: 97 F | RESPIRATION RATE: 20 BRPM | HEART RATE: 82 BPM | OXYGEN SATURATION: 98 % | WEIGHT: 223.99 LBS

## 2022-12-26 VITALS
SYSTOLIC BLOOD PRESSURE: 128 MMHG | HEART RATE: 78 BPM | RESPIRATION RATE: 20 BRPM | DIASTOLIC BLOOD PRESSURE: 81 MMHG | TEMPERATURE: 97 F | OXYGEN SATURATION: 98 %

## 2022-12-26 DIAGNOSIS — Z88.7 ALLERGY STATUS TO SERUM AND VACCINE: ICD-10-CM

## 2022-12-26 DIAGNOSIS — E03.9 HYPOTHYROIDISM, UNSPECIFIED: ICD-10-CM

## 2022-12-26 DIAGNOSIS — R10.31 RIGHT LOWER QUADRANT PAIN: ICD-10-CM

## 2022-12-26 DIAGNOSIS — Z87.19 PERSONAL HISTORY OF OTHER DISEASES OF THE DIGESTIVE SYSTEM: ICD-10-CM

## 2022-12-26 DIAGNOSIS — G51.0 BELL'S PALSY: ICD-10-CM

## 2022-12-26 DIAGNOSIS — Z87.440 PERSONAL HISTORY OF URINARY (TRACT) INFECTIONS: ICD-10-CM

## 2022-12-26 DIAGNOSIS — Z91.013 ALLERGY TO SEAFOOD: ICD-10-CM

## 2022-12-26 DIAGNOSIS — I10 ESSENTIAL (PRIMARY) HYPERTENSION: ICD-10-CM

## 2022-12-26 DIAGNOSIS — Z20.822 CONTACT WITH AND (SUSPECTED) EXPOSURE TO COVID-19: ICD-10-CM

## 2022-12-26 DIAGNOSIS — Z88.0 ALLERGY STATUS TO PENICILLIN: ICD-10-CM

## 2022-12-26 DIAGNOSIS — F17.200 NICOTINE DEPENDENCE, UNSPECIFIED, UNCOMPLICATED: ICD-10-CM

## 2022-12-26 DIAGNOSIS — E11.9 TYPE 2 DIABETES MELLITUS WITHOUT COMPLICATIONS: ICD-10-CM

## 2022-12-26 DIAGNOSIS — Z88.1 ALLERGY STATUS TO OTHER ANTIBIOTIC AGENTS STATUS: ICD-10-CM

## 2022-12-26 DIAGNOSIS — Z90.49 ACQUIRED ABSENCE OF OTHER SPECIFIED PARTS OF DIGESTIVE TRACT: ICD-10-CM

## 2022-12-26 LAB
ALBUMIN SERPL ELPH-MCNC: 4.4 G/DL — SIGNIFICANT CHANGE UP (ref 3.5–5.2)
ALP SERPL-CCNC: 66 U/L — SIGNIFICANT CHANGE UP (ref 30–115)
ALT FLD-CCNC: 22 U/L — SIGNIFICANT CHANGE UP (ref 0–41)
ANION GAP SERPL CALC-SCNC: 10 MMOL/L — SIGNIFICANT CHANGE UP (ref 7–14)
APPEARANCE UR: CLEAR — SIGNIFICANT CHANGE UP
APTT BLD: 31.3 SEC — SIGNIFICANT CHANGE UP (ref 27–39.2)
AST SERPL-CCNC: 24 U/L — SIGNIFICANT CHANGE UP (ref 0–41)
BASOPHILS # BLD AUTO: 0.08 K/UL — SIGNIFICANT CHANGE UP (ref 0–0.2)
BASOPHILS NFR BLD AUTO: 1.1 % — HIGH (ref 0–1)
BILIRUB SERPL-MCNC: 0.2 MG/DL — SIGNIFICANT CHANGE UP (ref 0.2–1.2)
BILIRUB UR-MCNC: NEGATIVE — SIGNIFICANT CHANGE UP
BUN SERPL-MCNC: 25 MG/DL — HIGH (ref 10–20)
CALCIUM SERPL-MCNC: 9.3 MG/DL — SIGNIFICANT CHANGE UP (ref 8.4–10.5)
CHLORIDE SERPL-SCNC: 99 MMOL/L — SIGNIFICANT CHANGE UP (ref 98–110)
CO2 SERPL-SCNC: 26 MMOL/L — SIGNIFICANT CHANGE UP (ref 17–32)
COLOR SPEC: YELLOW — SIGNIFICANT CHANGE UP
CREAT SERPL-MCNC: 0.9 MG/DL — SIGNIFICANT CHANGE UP (ref 0.7–1.5)
DIFF PNL FLD: NEGATIVE — SIGNIFICANT CHANGE UP
EGFR: 75 ML/MIN/1.73M2 — SIGNIFICANT CHANGE UP
EOSINOPHIL # BLD AUTO: 0.34 K/UL — SIGNIFICANT CHANGE UP (ref 0–0.7)
EOSINOPHIL NFR BLD AUTO: 4.8 % — SIGNIFICANT CHANGE UP (ref 0–8)
GLUCOSE SERPL-MCNC: 106 MG/DL — HIGH (ref 70–99)
GLUCOSE UR QL: NEGATIVE MG/DL — SIGNIFICANT CHANGE UP
HCT VFR BLD CALC: 35 % — LOW (ref 37–47)
HGB BLD-MCNC: 11.9 G/DL — LOW (ref 12–16)
IMM GRANULOCYTES NFR BLD AUTO: 0.3 % — SIGNIFICANT CHANGE UP (ref 0.1–0.3)
INR BLD: 0.91 RATIO — SIGNIFICANT CHANGE UP (ref 0.65–1.3)
KETONES UR-MCNC: NEGATIVE — SIGNIFICANT CHANGE UP
LEUKOCYTE ESTERASE UR-ACNC: NEGATIVE — SIGNIFICANT CHANGE UP
LIDOCAIN IGE QN: 46 U/L — SIGNIFICANT CHANGE UP (ref 7–60)
LYMPHOCYTES # BLD AUTO: 2.49 K/UL — SIGNIFICANT CHANGE UP (ref 1.2–3.4)
LYMPHOCYTES # BLD AUTO: 35.2 % — SIGNIFICANT CHANGE UP (ref 20.5–51.1)
MCHC RBC-ENTMCNC: 33.6 PG — HIGH (ref 27–31)
MCHC RBC-ENTMCNC: 34 G/DL — SIGNIFICANT CHANGE UP (ref 32–37)
MCV RBC AUTO: 98.9 FL — SIGNIFICANT CHANGE UP (ref 81–99)
MONOCYTES # BLD AUTO: 0.64 K/UL — HIGH (ref 0.1–0.6)
MONOCYTES NFR BLD AUTO: 9 % — SIGNIFICANT CHANGE UP (ref 1.7–9.3)
NEUTROPHILS # BLD AUTO: 3.51 K/UL — SIGNIFICANT CHANGE UP (ref 1.4–6.5)
NEUTROPHILS NFR BLD AUTO: 49.6 % — SIGNIFICANT CHANGE UP (ref 42.2–75.2)
NITRITE UR-MCNC: NEGATIVE — SIGNIFICANT CHANGE UP
NRBC # BLD: 0 /100 WBCS — SIGNIFICANT CHANGE UP (ref 0–0)
PH UR: 6 — SIGNIFICANT CHANGE UP (ref 5–8)
PLATELET # BLD AUTO: 197 K/UL — SIGNIFICANT CHANGE UP (ref 130–400)
POTASSIUM SERPL-MCNC: 4.4 MMOL/L — SIGNIFICANT CHANGE UP (ref 3.5–5)
POTASSIUM SERPL-SCNC: 4.4 MMOL/L — SIGNIFICANT CHANGE UP (ref 3.5–5)
PROT SERPL-MCNC: 6.6 G/DL — SIGNIFICANT CHANGE UP (ref 6–8)
PROT UR-MCNC: NEGATIVE MG/DL — SIGNIFICANT CHANGE UP
PROTHROM AB SERPL-ACNC: 10.4 SEC — SIGNIFICANT CHANGE UP (ref 9.95–12.87)
RBC # BLD: 3.54 M/UL — LOW (ref 4.2–5.4)
RBC # FLD: 14.6 % — HIGH (ref 11.5–14.5)
SARS-COV-2 RNA SPEC QL NAA+PROBE: SIGNIFICANT CHANGE UP
SODIUM SERPL-SCNC: 135 MMOL/L — SIGNIFICANT CHANGE UP (ref 135–146)
SP GR SPEC: 1.02 — SIGNIFICANT CHANGE UP (ref 1.01–1.03)
UROBILINOGEN FLD QL: 0.2 MG/DL — SIGNIFICANT CHANGE UP
WBC # BLD: 7.08 K/UL — SIGNIFICANT CHANGE UP (ref 4.8–10.8)
WBC # FLD AUTO: 7.08 K/UL — SIGNIFICANT CHANGE UP (ref 4.8–10.8)

## 2022-12-26 PROCEDURE — 74177 CT ABD & PELVIS W/CONTRAST: CPT | Mod: 26,MA

## 2022-12-26 PROCEDURE — 99285 EMERGENCY DEPT VISIT HI MDM: CPT

## 2022-12-26 RX ORDER — KETOROLAC TROMETHAMINE 30 MG/ML
30 SYRINGE (ML) INJECTION ONCE
Refills: 0 | Status: DISCONTINUED | OUTPATIENT
Start: 2022-12-26 | End: 2022-12-26

## 2022-12-26 RX ORDER — DIPHENHYDRAMINE HCL 50 MG
50 CAPSULE ORAL ONCE
Refills: 0 | Status: COMPLETED | OUTPATIENT
Start: 2022-12-26 | End: 2022-12-26

## 2022-12-26 RX ORDER — ACETAMINOPHEN 500 MG
650 TABLET ORAL ONCE
Refills: 0 | Status: COMPLETED | OUTPATIENT
Start: 2022-12-26 | End: 2022-12-26

## 2022-12-26 RX ORDER — SODIUM CHLORIDE 9 MG/ML
1000 INJECTION, SOLUTION INTRAVENOUS ONCE
Refills: 0 | Status: COMPLETED | OUTPATIENT
Start: 2022-12-26 | End: 2022-12-26

## 2022-12-26 RX ADMIN — Medication 50 MILLIGRAM(S): at 13:50

## 2022-12-26 RX ADMIN — Medication 30 MILLIGRAM(S): at 12:21

## 2022-12-26 RX ADMIN — Medication 125 MILLIGRAM(S): at 13:50

## 2022-12-26 RX ADMIN — Medication 650 MILLIGRAM(S): at 14:27

## 2022-12-26 RX ADMIN — SODIUM CHLORIDE 1000 MILLILITER(S): 9 INJECTION, SOLUTION INTRAVENOUS at 12:13

## 2022-12-26 NOTE — ED PROVIDER NOTE - PROGRESS NOTE DETAILS
results discussed with patient . will give patient gi follow up Patient is a good candidate to attempt outpatient management. Supportive care and home care discussed in detail. Patient aware they may have to return for re-evaluation and possible admission if outpatient treatment fails. Strict return precautions discussed.

## 2022-12-26 NOTE — ED PROVIDER NOTE - CLINICAL SUMMARY MEDICAL DECISION MAKING FREE TEXT BOX
57-year-old female with past medical history of diabetes, high blood pressure, hypothyroidism, Bell's palsy with residual right-sided facial droop, previous urinary tract infections, past surgical history of cholecystectomy liposuction, presents with about 3 weeks of right lower quadrant pain initially felt like a bruise today became more stabbing and constant, moderate in intensity, nonradiating, associated with a fever of 102 3 weeks ago that subsided, and nonbloody diarrhea that resolved.  Patient reports abdominal pain has persisted.  Patient is postmenopausal.  Patient is a smoker.  Allergy to Keflex.  No fever, chills, n/v, cp,  pleuritic cp, sob, palpitations, diaphoresis, cough, ha/lh/dizziness, numbness/tingling, neck pain/ stiffness, constipation, melena/brbpr, urinary symptoms, vaginal bleeding/discharge/odor, trauma, weakness, edema, calf pain/swelling/erythema, sick contacts, recent travel or rash.   Patient feeling better, abdomen reexam soft, nontender, nondistended, no rebound, no guarding, tolerated p.o.  White count 7.08, afebrile, lipase 46, urine negative, CT imaging with no acute pathology.  Patient aware of incidental findings including diverticulosis.  Degenerative changes of the spine.  Atherosclerotic calcifications.  Patient comfortable with outpatient follow-up with PMD and gastroenterology.  Copy of results provided.  Strict return precautions provided.  will follow-up.

## 2022-12-26 NOTE — ED PROVIDER NOTE - CARE PROVIDER_API CALL
Casandra Diamond  GASTROENTEROLOGY  [address] [place] [address]  Phone: [phone]  Fax: [phone]  Follow Up Time: 1-3 Days

## 2022-12-26 NOTE — ED PROVIDER NOTE - PATIENT PORTAL LINK FT
You can access the FollowMyHealth Patient Portal offered by [facility] by registering at the following website: [email or web address]. By joining [facility]’s FollowMyHealth portal, you will also be able to view your health information using other applications (apps) compatible with our system.

## 2022-12-26 NOTE — ED PROVIDER NOTE - NSFOLLOWUPINSTRUCTIONS_ED_ALL_ED_FT
Acute Abdominal Pain    WHAT YOU NEED TO KNOW:    The cause of your abdominal pain may not be found. If a cause is found, treatment will depend on what the cause is.     DISCHARGE INSTRUCTIONS:    Return to the emergency department if:     You vomit blood or cannot stop vomiting.      You have blood in your bowel movement or it looks like tar.       You have bleeding from your rectum.       Your abdomen is larger than usual, more painful, and hard.       You have severe pain in your abdomen.       You stop passing gas and having bowel movements.       You feel weak, dizzy, or faint.    Contact your healthcare provider if:     You have a fever.      You have new signs and symptoms.      Your symptoms do not get better with treatment.       You have questions or concerns about your condition or care.    Medicines may be given to decrease pain, treat an infection, and manage your symptoms. Take your medicine as directed. Call your healthcare provider if you think your medicine is not helping or if you have side effects. Tell him if you are allergic to any medicine. Keep a list of the medicines, vitamins, and herbs you take. Include the amounts, and when and why you take them. Bring the list or the pill bottles to follow-up visits. Carry your medicine list with you in case of an emergency.    Manage your symptoms:     Apply heat on your abdomen for 20 to 30 minutes every 2 hours for as many days as directed. Heat helps decrease pain and muscle spasms.       Manage your stress. Stress may cause abdominal pain. Your healthcare provider may recommend relaxation techniques and deep breathing exercises to help decrease your stress. Your healthcare provider may recommend you talk to someone about your stress or anxiety, such as a counselor or a trusted friend. Get plenty of sleep and exercise regularly.       Limit or do not drink alcohol. Alcohol can make your abdominal pain worse. Ask your healthcare provider if it is safe for you to drink alcohol. Also ask how much is safe for you to drink.       Do not smoke. Nicotine and other chemicals in cigarettes can damage your esophagus and stomach. Ask your healthcare provider for information if you currently smoke and need help to quit. E-cigarettes or smokeless tobacco still contain nicotine. Talk to your healthcare provider before you use these products.     Make changes to the food you eat as directed: Do not eat foods that cause abdominal pain or other symptoms. Eat small meals more often.     Eat more high-fiber foods if you are constipated. High-fiber foods include fruits, vegetables, whole-grain foods, and legumes.       Do not eat foods that cause gas if you have bloating. Examples include broccoli, cabbage, and cauliflower. Do not drink soda or carbonated drinks, because these may also cause gas.       Do not eat foods or drinks that contain sorbitol or fructose if you have diarrhea and bloating. Some examples are fruit juices, candy, jelly, and sugar-free gum.       Do not eat high-fat foods, such as fried foods, cheeseburgers, hot dogs, and desserts.      Limit or do not drink caffeine. Caffeine may make symptoms, such as heart burn or nausea, worse.       Drink plenty of liquids to prevent dehydration from diarrhea or vomiting. Ask your healthcare provider how much liquid to drink each day and which liquids are best for you.     Follow up with your healthcare provider as directed: Write down your questions so you remember to ask them during your visits.       © Copyright [employer] 2018 All illustrations and images included in CareNotes are the copyrighted property of [employer].A[employer]M., Inc[employer] or [employer].

## 2022-12-26 NOTE — ED PROVIDER NOTE - PHYSICAL EXAMINATION
--EXAM--  VITAL SIGNS: I have reviewed vs documented at present.  CONSTITUTIONAL: Well-developed; well-nourished; in no acute distress.   SKIN: Warm and dry, no acute rash.       CARD: S1, S2, Regular rate and rhythm.   RESP: No wheezes, rales or rhonchi.  ABD: Normal bowel sounds; soft; non-distended; tender RLQ  EXT: Normal ROM.   NEURO: Alert, oriented, grossly unremarkable. Strength 5/5 in all extremities. Sensation intact throughout.  PSYCH: Cooperative, appropriate.

## 2022-12-26 NOTE — ED PROVIDER NOTE - OBJECTIVE STATEMENT
this is a 56 yo female presents to ed for evaluation of abdominal pain. patient states she has had intermittent pain for about 3weeks. patient states that last night pain got worse. patient states pain has been more constant. patient denies any nausea or vomiting or diarrhea.

## 2022-12-26 NOTE — ED PROVIDER NOTE - ATTENDING APP SHARED VISIT CONTRIBUTION OF CARE
57-year-old female with past medical history of diabetes, high blood pressure, hypothyroidism, Bell's palsy with residual right-sided facial droop, previous urinary tract infections, past surgical history of cholecystectomy liposuction, presents with about 3 weeks of right lower quadrant pain initially felt like a bruise today became more stabbing and constant, moderate in intensity, nonradiating, associated with a fever of 102 3 weeks ago that subsided, and nonbloody diarrhea that resolved.  Patient reports abdominal pain has persisted.  Patient is postmenopausal.  Patient is a smoker.  Allergy to Keflex.  No fever, chills, n/v, cp,  pleuritic cp, sob, palpitations, diaphoresis, cough, ha/lh/dizziness, numbness/tingling, neck pain/ stiffness, constipation, melena/brbpr, urinary symptoms, vaginal bleeding/discharge/odor, trauma, weakness, edema, calf pain/swelling/erythema, sick contacts, recent travel or rash.     On Exam: Vital Signs: I have reviewed the initial vital signs. Constitutional: Non toxic appearing pt sitting on stretcher speaking full sentences reporting pain to RLQ. Integumentary: No rash. No jaundice. EYES: No sclera Icterus. ENT: MMM NECK: Supple, non-tender, no meningeal signs. Cardiovascular: RRR, radial pulses 2/4 b/l. No JVD. Respiratory: BS present b/l, ctabl, no wheezing or crackles, no accessory muscle use, no stridor. Gastrointestinal: BS present throughout all 4 quadrants, soft, nd, pain to palpation to RLQ, no rebound tenderness or guarding, no cvat. (-) Browning's (-) Rovsing (-) Obturator (-) Psoas, Musculoskeletal: FROM, no edema, no calf pain/swelling/erythema. Neurologic: AAOx3, motor 5/5 and sensation intact throughout upper and lowe ext, CN II-XII intact, No facial droop or slurring of speech. No focal deficits.    Plan: labs, urine, ivf, pain control, imaging, reassess.

## 2022-12-26 NOTE — ED PROVIDER NOTE - NS ED ROS FT
Review of Systems:  	•	CONSTITUTIONAL - no fever, no diaphoresis, no chills    	•	HEMATOLOGIC - no bleeding, no bruising    	•	RESPIRATORY - no shortness of breath, no cough  	•	CARDIAC - no chest pain, no palpitations  	•	GI -  abd pain, no nausea, no vomiting, no diarrhea, no constipation  	•	GENITO-URINARY - no discharge, no dysuria; no hematuria, no increased urinary frequency  	•	MUSCULOSKELETAL - no joint paint, no swelling, no redness  	•	NEUROLOGIC - no weakness, no headache, no paresthesias, no LOC

## 2022-12-26 NOTE — ED PROVIDER NOTE - NS ED ATTENDING STATEMENT MOD
This was a shared visit with the DEWEY. I reviewed and verified the documentation and independently performed the documented:

## 2022-12-27 LAB
CULTURE RESULTS: SIGNIFICANT CHANGE UP
SPECIMEN SOURCE: SIGNIFICANT CHANGE UP

## 2023-10-04 ENCOUNTER — EMERGENCY (EMERGENCY)
Facility: HOSPITAL | Age: 58
LOS: 0 days | Discharge: HOME | End: 2023-10-04
Attending: EMERGENCY MEDICINE
Payer: COMMERCIAL

## 2023-10-04 VITALS
RESPIRATION RATE: 20 BRPM | HEART RATE: 63 BPM | DIASTOLIC BLOOD PRESSURE: 73 MMHG | SYSTOLIC BLOOD PRESSURE: 120 MMHG | OXYGEN SATURATION: 100 %

## 2023-10-04 VITALS
RESPIRATION RATE: 20 BRPM | SYSTOLIC BLOOD PRESSURE: 111 MMHG | TEMPERATURE: 98 F | DIASTOLIC BLOOD PRESSURE: 70 MMHG | HEART RATE: 60 BPM | WEIGHT: 220.02 LBS

## 2023-10-04 DIAGNOSIS — Z87.19 PERSONAL HISTORY OF OTHER DISEASES OF THE DIGESTIVE SYSTEM: ICD-10-CM
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
## 2023-10-04 DIAGNOSIS — R10.9 UNSPECIFIED ABDOMINAL PAIN: ICD-10-CM

## 2023-10-04 DIAGNOSIS — M54.9 DORSALGIA, UNSPECIFIED: ICD-10-CM

## 2023-10-04 DIAGNOSIS — E03.9 HYPOTHYROIDISM, UNSPECIFIED: ICD-10-CM

## 2023-10-04 DIAGNOSIS — Z88.7 ALLERGY STATUS TO SERUM AND VACCINE: ICD-10-CM
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
## 2023-10-04 DIAGNOSIS — Z91.013 ALLERGY TO SEAFOOD: ICD-10-CM

## 2023-10-04 DIAGNOSIS — G40.909 EPILEPSY, UNSPECIFIED, NOT INTRACTABLE, WITHOUT STATUS EPILEPTICUS: ICD-10-CM

## 2023-10-04 DIAGNOSIS — Z88.1 ALLERGY STATUS TO OTHER ANTIBIOTIC AGENTS STATUS: ICD-10-CM

## 2023-10-04 DIAGNOSIS — M54.50 LOW BACK PAIN, UNSPECIFIED: ICD-10-CM

## 2023-10-04 DIAGNOSIS — Z88.0 ALLERGY STATUS TO PENICILLIN: ICD-10-CM

## 2023-10-04 LAB
ALBUMIN SERPL ELPH-MCNC: 4.1 G/DL — SIGNIFICANT CHANGE UP (ref 3.5–5.2)
ALP SERPL-CCNC: 59 U/L — SIGNIFICANT CHANGE UP (ref 30–115)
ALT FLD-CCNC: 25 U/L — SIGNIFICANT CHANGE UP (ref 0–41)
ANION GAP SERPL CALC-SCNC: 9 MMOL/L — SIGNIFICANT CHANGE UP (ref 7–14)
APPEARANCE UR: CLEAR — SIGNIFICANT CHANGE UP
APTT BLD: 32.1 SEC — SIGNIFICANT CHANGE UP (ref 27–39.2)
AST SERPL-CCNC: 25 U/L — SIGNIFICANT CHANGE UP (ref 0–41)
BASOPHILS # BLD AUTO: 0.04 K/UL — SIGNIFICANT CHANGE UP (ref 0–0.2)
BASOPHILS NFR BLD AUTO: 0.6 % — SIGNIFICANT CHANGE UP (ref 0–1)
BILIRUB SERPL-MCNC: <0.2 MG/DL — SIGNIFICANT CHANGE UP (ref 0.2–1.2)
BILIRUB UR-MCNC: NEGATIVE — SIGNIFICANT CHANGE UP
BUN SERPL-MCNC: 17 MG/DL — SIGNIFICANT CHANGE UP (ref 10–20)
CALCIUM SERPL-MCNC: 9 MG/DL — SIGNIFICANT CHANGE UP (ref 8.4–10.5)
CHLORIDE SERPL-SCNC: 106 MMOL/L — SIGNIFICANT CHANGE UP (ref 98–110)
CO2 SERPL-SCNC: 25 MMOL/L — SIGNIFICANT CHANGE UP (ref 17–32)
COLOR SPEC: YELLOW — SIGNIFICANT CHANGE UP
CREAT SERPL-MCNC: 1.1 MG/DL — SIGNIFICANT CHANGE UP (ref 0.7–1.5)
DIFF PNL FLD: NEGATIVE — SIGNIFICANT CHANGE UP
EGFR: 59 ML/MIN/1.73M2 — LOW
EOSINOPHIL # BLD AUTO: 0.22 K/UL — SIGNIFICANT CHANGE UP (ref 0–0.7)
EOSINOPHIL NFR BLD AUTO: 3.6 % — SIGNIFICANT CHANGE UP (ref 0–8)
GLUCOSE SERPL-MCNC: 108 MG/DL — HIGH (ref 70–99)
GLUCOSE UR QL: NEGATIVE MG/DL — SIGNIFICANT CHANGE UP
HCT VFR BLD CALC: 33.5 % — LOW (ref 37–47)
HGB BLD-MCNC: 11.1 G/DL — LOW (ref 12–16)
IMM GRANULOCYTES NFR BLD AUTO: 0.5 % — HIGH (ref 0.1–0.3)
INR BLD: 0.93 RATIO — SIGNIFICANT CHANGE UP (ref 0.65–1.3)
KETONES UR-MCNC: NEGATIVE MG/DL — SIGNIFICANT CHANGE UP
LEUKOCYTE ESTERASE UR-ACNC: NEGATIVE — SIGNIFICANT CHANGE UP
LIDOCAIN IGE QN: 35 U/L — SIGNIFICANT CHANGE UP (ref 7–60)
LYMPHOCYTES # BLD AUTO: 2.77 K/UL — SIGNIFICANT CHANGE UP (ref 1.2–3.4)
LYMPHOCYTES # BLD AUTO: 44.9 % — SIGNIFICANT CHANGE UP (ref 20.5–51.1)
MCHC RBC-ENTMCNC: 33.1 G/DL — SIGNIFICANT CHANGE UP (ref 32–37)
MCHC RBC-ENTMCNC: 33.3 PG — HIGH (ref 27–31)
MCV RBC AUTO: 100.6 FL — HIGH (ref 81–99)
MONOCYTES # BLD AUTO: 0.47 K/UL — SIGNIFICANT CHANGE UP (ref 0.1–0.6)
MONOCYTES NFR BLD AUTO: 7.6 % — SIGNIFICANT CHANGE UP (ref 1.7–9.3)
NEUTROPHILS # BLD AUTO: 2.64 K/UL — SIGNIFICANT CHANGE UP (ref 1.4–6.5)
NEUTROPHILS NFR BLD AUTO: 42.8 % — SIGNIFICANT CHANGE UP (ref 42.2–75.2)
NITRITE UR-MCNC: NEGATIVE — SIGNIFICANT CHANGE UP
NRBC # BLD: 0 /100 WBCS — SIGNIFICANT CHANGE UP (ref 0–0)
PH UR: 6 — SIGNIFICANT CHANGE UP (ref 5–8)
PLATELET # BLD AUTO: 160 K/UL — SIGNIFICANT CHANGE UP (ref 130–400)
PMV BLD: 11.5 FL — HIGH (ref 7.4–10.4)
POTASSIUM SERPL-MCNC: 5 MMOL/L — SIGNIFICANT CHANGE UP (ref 3.5–5)
POTASSIUM SERPL-SCNC: 5 MMOL/L — SIGNIFICANT CHANGE UP (ref 3.5–5)
PROT SERPL-MCNC: 6.4 G/DL — SIGNIFICANT CHANGE UP (ref 6–8)
PROT UR-MCNC: NEGATIVE MG/DL — SIGNIFICANT CHANGE UP
PROTHROM AB SERPL-ACNC: 10.6 SEC — SIGNIFICANT CHANGE UP (ref 9.95–12.87)
RBC # BLD: 3.33 M/UL — LOW (ref 4.2–5.4)
RBC # FLD: 14.4 % — SIGNIFICANT CHANGE UP (ref 11.5–14.5)
SODIUM SERPL-SCNC: 140 MMOL/L — SIGNIFICANT CHANGE UP (ref 135–146)
SP GR SPEC: >1.03 — HIGH (ref 1–1.03)
UROBILINOGEN FLD QL: 0.2 MG/DL — SIGNIFICANT CHANGE UP (ref 0.2–1)
WBC # BLD: 6.17 K/UL — SIGNIFICANT CHANGE UP (ref 4.8–10.8)
WBC # FLD AUTO: 6.17 K/UL — SIGNIFICANT CHANGE UP (ref 4.8–10.8)

## 2023-10-04 PROCEDURE — 87186 SC STD MICRODIL/AGAR DIL: CPT

## 2023-10-04 PROCEDURE — 87077 CULTURE AEROBIC IDENTIFY: CPT

## 2023-10-04 PROCEDURE — 87086 URINE CULTURE/COLONY COUNT: CPT

## 2023-10-04 PROCEDURE — 99285 EMERGENCY DEPT VISIT HI MDM: CPT

## 2023-10-04 PROCEDURE — 85025 COMPLETE CBC W/AUTO DIFF WBC: CPT

## 2023-10-04 PROCEDURE — 85730 THROMBOPLASTIN TIME PARTIAL: CPT

## 2023-10-04 PROCEDURE — 96375 TX/PRO/DX INJ NEW DRUG ADDON: CPT

## 2023-10-04 PROCEDURE — 74177 CT ABD & PELVIS W/CONTRAST: CPT | Mod: MA

## 2023-10-04 PROCEDURE — 96374 THER/PROPH/DIAG INJ IV PUSH: CPT | Mod: XU

## 2023-10-04 PROCEDURE — 81003 URINALYSIS AUTO W/O SCOPE: CPT

## 2023-10-04 PROCEDURE — 99284 EMERGENCY DEPT VISIT MOD MDM: CPT | Mod: 25

## 2023-10-04 PROCEDURE — 80053 COMPREHEN METABOLIC PANEL: CPT

## 2023-10-04 PROCEDURE — 83690 ASSAY OF LIPASE: CPT

## 2023-10-04 PROCEDURE — 36415 COLL VENOUS BLD VENIPUNCTURE: CPT

## 2023-10-04 PROCEDURE — 74177 CT ABD & PELVIS W/CONTRAST: CPT | Mod: 26,MA

## 2023-10-04 PROCEDURE — 85610 PROTHROMBIN TIME: CPT

## 2023-10-04 RX ORDER — DIPHENHYDRAMINE HCL 50 MG
50 CAPSULE ORAL ONCE
Refills: 0 | Status: COMPLETED | OUTPATIENT
Start: 2023-10-04 | End: 2023-10-04

## 2023-10-04 RX ORDER — METHOCARBAMOL 500 MG/1
1000 TABLET, FILM COATED ORAL ONCE
Refills: 0 | Status: COMPLETED | OUTPATIENT
Start: 2023-10-04 | End: 2023-10-04

## 2023-10-04 RX ORDER — KETOROLAC TROMETHAMINE 30 MG/ML
15 SYRINGE (ML) INJECTION ONCE
Refills: 0 | Status: DISCONTINUED | OUTPATIENT
Start: 2023-10-04 | End: 2023-10-04

## 2023-10-04 RX ADMIN — Medication 50 MILLIGRAM(S): at 13:30

## 2023-10-04 RX ADMIN — Medication 15 MILLIGRAM(S): at 13:20

## 2023-10-04 RX ADMIN — METHOCARBAMOL 1000 MILLIGRAM(S): 500 TABLET, FILM COATED ORAL at 13:20

## 2023-10-04 NOTE — ED ADULT NURSE NOTE - PRIMARY CARE PROVIDER
Post-Op Assessment Note      CV Status:  Stable    Mental Status:  Awake    Hydration Status:  Stable    PONV Controlled:  None    Airway Patency:  Patent and adequate    Post Op Vitals Reviewed: Yes          Staff: Anesthesiologist, CRNA           BP   94/58   Temp      Pulse  64   Resp   18   SpO2   97%
pmd

## 2023-10-04 NOTE — ED ADULT TRIAGE NOTE - BP NONINVASIVE DIASTOLIC (MM HG)
70 Niacinamide Counseling: I recommended taking niacin or niacinamide, also know as vitamin B3, twice daily. Recent evidence suggests that taking vitamin B3 (500 mg twice daily) can reduce the risk of actinic keratoses and non-melanoma skin cancers. Side effects of vitamin B3 include flushing and headache.

## 2023-10-04 NOTE — ED ADULT TRIAGE NOTE - CHIEF COMPLAINT QUOTE
"I hurt my back three days ago, my back pain turned into stomach pain and burning and tingling in my feet; I keep getting a zip zip in my brain"

## 2023-10-04 NOTE — ED PROVIDER NOTE - ATTENDING APP SHARED VISIT CONTRIBUTION OF CARE
back pain after bending over and lifting, pain in right lower back radiating to abd. no uarinary complaints, no vomiting, no fevers. worse with movement. on exam mild rlq tenderness but no rebound or guarding. plan is to obtain labs, and ct imaging.

## 2023-10-04 NOTE — ED PROVIDER NOTE - PROGRESS NOTE DETAILS
Also discussed with patient.  Patient states that she is going to go onto FMLA.  Patient states her back is bothering her too much.  Patient will follow-up with her PMD this week.

## 2023-10-04 NOTE — ED PROVIDER NOTE - NSFOLLOWUPINSTRUCTIONS_ED_ALL_ED_FT
Acute Abdominal Pain    WHAT YOU NEED TO KNOW:    The cause of your abdominal pain may not be found. If a cause is found, treatment will depend on what the cause is.     DISCHARGE INSTRUCTIONS:    Return to the emergency department if:     You vomit blood or cannot stop vomiting.      You have blood in your bowel movement or it looks like tar.       You have bleeding from your rectum.       Your abdomen is larger than usual, more painful, and hard.       You have severe pain in your abdomen.       You stop passing gas and having bowel movements.       You feel weak, dizzy, or faint.    Contact your healthcare provider if:     You have a fever.      You have new signs and symptoms.      Your symptoms do not get better with treatment.       You have questions or concerns about your condition or care.    Medicines may be given to decrease pain, treat an infection, and manage your symptoms. Take your medicine as directed. Call your healthcare provider if you think your medicine is not helping or if you have side effects. Tell him if you are allergic to any medicine. Keep a list of the medicines, vitamins, and herbs you take. Include the amounts, and when and why you take them. Bring the list or the pill bottles to follow-up visits. Carry your medicine list with you in case of an emergency.    Manage your symptoms:     Apply heat on your abdomen for 20 to 30 minutes every 2 hours for as many days as directed. Heat helps decrease pain and muscle spasms.       Manage your stress. Stress may cause abdominal pain. Your healthcare provider may recommend relaxation techniques and deep breathing exercises to help decrease your stress. Your healthcare provider may recommend you talk to someone about your stress or anxiety, such as a counselor or a trusted friend. Get plenty of sleep and exercise regularly.       Limit or do not drink alcohol. Alcohol can make your abdominal pain worse. Ask your healthcare provider if it is safe for you to drink alcohol. Also ask how much is safe for you to drink.       Do not smoke. Nicotine and other chemicals in cigarettes can damage your esophagus and stomach. Ask your healthcare provider for information if you currently smoke and need help to quit. E-cigarettes or smokeless tobacco still contain nicotine. Talk to your healthcare provider before you use these products.     Make changes to the food you eat as directed: Do not eat foods that cause abdominal pain or other symptoms. Eat small meals more often.     Eat more high-fiber foods if you are constipated. High-fiber foods include fruits, vegetables, whole-grain foods, and legumes.       Do not eat foods that cause gas if you have bloating. Examples include broccoli, cabbage, and cauliflower. Do not drink soda or carbonated drinks, because these may also cause gas.       Do not eat foods or drinks that contain sorbitol or fructose if you have diarrhea and bloating. Some examples are fruit juices, candy, jelly, and sugar-free gum.       Do not eat high-fat foods, such as fried foods, cheeseburgers, hot dogs, and desserts.      Limit or do not drink caffeine. Caffeine may make symptoms, such as heart burn or nausea, worse.       Drink plenty of liquids to prevent dehydration from diarrhea or vomiting. Ask your healthcare provider how much liquid to drink each day and which liquids are best for you.     Follow up with your healthcare provider as directed: Write down your questions so you remember to ask them during your visits.       © Copyright [employer] 2018 All illustrations and images included in CareNotes are the copyrighted property of [employer] or [employer].

## 2023-10-04 NOTE — ED ADULT NURSE NOTE - NSFALLHARMRISKINTERV_ED_ALL_ED

## 2023-10-04 NOTE — ED ADULT NURSE NOTE - AS PAIN REST
PHYSICAL / OCCUPATIONAL THERAPY - DAILY TREATMENT NOTE (updated )    Patient Name: Alondra Pierre    Date: 2023    : 1986  Insurance: Payor: Art Gonzalez / Plan: Lafrances Perish / Product Type: *No Product type* /      Patient  verified Yes     Visit #   Current / Total 2 24   Time   In / Out [id number]   Pain   In / Out 10/10 7/10   Subjective Functional Status/Changes: The pain is still a 10/10 in the middle low back. She did some exercise at work and that seems to not help. Pt inquiring about getting a TENS unit. Changes to:  Meds, Allergies, Med Hx, Sx Hx? If yes, update Summary List no       TREATMENT AREA =  Neck pain [M54.2]  Other low back pain [M54.59]    OBJECTIVE    Modalities Rationale:     decrease pain and increase tissue extensibility to improve patient's ability to progress to PLOF and address remaining functional goals. min [] Estim Unattended, type/location:                                      []  w/ice    []  w/heat    min [] Estim Attended, type/location:                                     []  w/US     []  w/ice    []  w/heat    []  TENS insruct      min []  Mechanical Traction: type/lbs                   []  pro   []  sup   []  int   []  cont    []  before manual    []  after manual    min []  Ultrasound, settings/location:      min []  Iontophoresis w/ dexamethasone, location:                                               []  take home patch       []  in clinic   10 min  unbill []  Ice     [x]  Heat    location/position: Incline low back    min []  Paraffin,  details:     min []  Vasopneumatic Device, press/temp:     min []  Janine Muckle / Bertha Pack:     If using vaso (only need to measure limb vaso being performed on)      pre-treatment girth :       post-treatment girth :       measured at (landmark location) :      min []  Other:    Skin assessment post-treatment (if applicable):    []  intact    []  redness- no adverse reaction 6 (moderate pain)

## 2023-10-04 NOTE — ED PROVIDER NOTE - PHYSICAL EXAMINATION
--EXAM--  VITAL SIGNS: I have reviewed vs documented at present.  CONSTITUTIONAL: Well-developed; well-nourished; in no acute distress.   SKIN: Warm and dry, no acute rash.   HEAD: Normocephalic; atraumatic.    NECK: Supple; non tender.  CARD: S1, S2, Regular rate and rhythm.   RESP: No wheezes, rales or rhonchi.  ABD: Normal bowel sounds; soft; non-distended; tender rlq  EXT: Normal ROM.   NEURO: Alert, oriented, grossly unremarkable. Strength 5/5 in all extremities. Sensation intact throughout.  PSYCH: Cooperative, appropriate.

## 2023-10-04 NOTE — ED PROVIDER NOTE - OBJECTIVE STATEMENT
57-year-old female presents to the ED for evaluation of back pain.  Patient states a couple days ago she was bending over and hurt her back.  Patient states she has been dealing with the pain for couple days now and pain is relieved with Motrin.  Patient came to the ED today because she says she was having some right-sided abdominal pain.  Patient denies any nausea and/or vomiting.  Patient denies any diarrhea or constipation.  Patient denies any fever or chills.  Patient denies any urinary symptoms

## 2023-10-04 NOTE — ED PROVIDER NOTE - CLINICAL SUMMARY MEDICAL DECISION MAKING FREE TEXT BOX
back pain and abd pain, likely  msk, imaging obtained to evaluate for intraabdominal pathology which was nml. ua obtained and nml including labs. treated with nsaids with improvement in pain, no neuro deficit on exam. able to ambulate.

## 2023-10-04 NOTE — ED PROVIDER NOTE - PATIENT PORTAL LINK FT
You can access the FollowMyHealth Patient Portal offered by [facility] by registering at the following website: [email or web address]. By joining [facility]’s FollowMyHealth portal, you will also be able to view your health information using other applications (apps) compatible with our system.

## 2023-10-05 RX ORDER — IBUPROFEN 200 MG
1 TABLET ORAL
Qty: 21 | Refills: 0
Start: 2023-10-05 | End: 2023-10-11

## 2023-10-05 RX ORDER — LIDOCAINE 4 G/100G
1 CREAM TOPICAL
Qty: 1 | Refills: 0
Start: 2023-10-05 | End: 2023-10-18

## 2023-10-05 NOTE — ED POST DISCHARGE NOTE - RESULT SUMMARY
PATIENT CALLED, MEDS NOT SENT YESTERDAY. NEED  MG AND LIDOCAINE PATCH 4% SENT TO [facility].. RX SENT

## 2023-10-07 LAB
-  AMIKACIN: SIGNIFICANT CHANGE UP
-  AMOXICILLIN/CLAVULANIC ACID: SIGNIFICANT CHANGE UP
-  AMPICILLIN/SULBACTAM: SIGNIFICANT CHANGE UP
-  AMPICILLIN: SIGNIFICANT CHANGE UP
-  AZTREONAM: SIGNIFICANT CHANGE UP
-  CEFAZOLIN: SIGNIFICANT CHANGE UP
-  CEFEPIME: SIGNIFICANT CHANGE UP
-  CEFOXITIN: SIGNIFICANT CHANGE UP
-  CEFTRIAXONE: SIGNIFICANT CHANGE UP
-  CEFUROXIME: SIGNIFICANT CHANGE UP
-  CIPROFLOXACIN: SIGNIFICANT CHANGE UP
-  ERTAPENEM: SIGNIFICANT CHANGE UP
-  GENTAMICIN: SIGNIFICANT CHANGE UP
-  IMIPENEM: SIGNIFICANT CHANGE UP
-  LEVOFLOXACIN: SIGNIFICANT CHANGE UP
-  MEROPENEM: SIGNIFICANT CHANGE UP
-  NITROFURANTOIN: SIGNIFICANT CHANGE UP
-  PIPERACILLIN/TAZOBACTAM: SIGNIFICANT CHANGE UP
-  TOBRAMYCIN: SIGNIFICANT CHANGE UP
-  TRIMETHOPRIM/SULFAMETHOXAZOLE: SIGNIFICANT CHANGE UP
METHOD TYPE: SIGNIFICANT CHANGE UP

## 2023-10-08 LAB
-  AMPICILLIN: SIGNIFICANT CHANGE UP
-  CIPROFLOXACIN: SIGNIFICANT CHANGE UP
-  LEVOFLOXACIN: SIGNIFICANT CHANGE UP
-  NITROFURANTOIN: SIGNIFICANT CHANGE UP
-  TETRACYCLINE: SIGNIFICANT CHANGE UP
-  VANCOMYCIN: SIGNIFICANT CHANGE UP
CULTURE RESULTS: SIGNIFICANT CHANGE UP
METHOD TYPE: SIGNIFICANT CHANGE UP
ORGANISM # SPEC MICROSCOPIC CNT: SIGNIFICANT CHANGE UP
SPECIMEN SOURCE: SIGNIFICANT CHANGE UP

## 2023-10-11 ENCOUNTER — APPOINTMENT (OUTPATIENT)
Dept: NEUROSURGERY | Facility: CLINIC | Age: 58
End: 2023-10-11
Payer: COMMERCIAL

## 2023-10-11 VITALS
RESPIRATION RATE: 18 BRPM | HEART RATE: 67 BPM | HEIGHT: 66 IN | OXYGEN SATURATION: 98 % | WEIGHT: 220 LBS | BODY MASS INDEX: 35.36 KG/M2 | SYSTOLIC BLOOD PRESSURE: 129 MMHG | DIASTOLIC BLOOD PRESSURE: 90 MMHG

## 2023-10-11 DIAGNOSIS — Z86.39 PERSONAL HISTORY OF OTHER ENDOCRINE, NUTRITIONAL AND METABOLIC DISEASE: ICD-10-CM

## 2023-10-11 DIAGNOSIS — Z87.19 PERSONAL HISTORY OF OTHER DISEASES OF THE DIGESTIVE SYSTEM: ICD-10-CM

## 2023-10-11 DIAGNOSIS — Z86.69 PERSONAL HISTORY OF OTHER DISEASES OF THE NERVOUS SYSTEM AND SENSE ORGANS: ICD-10-CM

## 2023-10-11 PROCEDURE — 99202 OFFICE O/P NEW SF 15 MIN: CPT

## 2023-10-11 RX ORDER — LEVOTHYROXINE SODIUM 125 UG/1
125 TABLET ORAL
Refills: 0 | Status: ACTIVE | COMMUNITY

## 2023-10-11 RX ORDER — LORAZEPAM 2 MG/1
TABLET ORAL
Refills: 0 | Status: ACTIVE | COMMUNITY

## 2023-10-11 RX ORDER — NITROFURANTOIN MONOHYDRATE/MACROCRYSTALLINE 25; 75 MG/1; MG/1
100 CAPSULE ORAL
Refills: 0 | Status: ACTIVE | COMMUNITY

## 2023-10-11 RX ORDER — ZOLPIDEM TARTRATE 5 MG/1
TABLET, FILM COATED ORAL
Refills: 0 | Status: ACTIVE | COMMUNITY

## 2023-10-13 RX ORDER — NITROFURANTOIN MACROCRYSTAL 50 MG
1 CAPSULE ORAL
Qty: 14 | Refills: 0
Start: 2023-10-13 | End: 2023-10-19

## 2023-11-22 NOTE — ED ADULT TRIAGE NOTE - ESI TRIAGE ACUITY LEVEL, MLM
Thank you for allowing us to be a part of your care today. We strive to provide the best care for you today and in the future. Here are a few important reminders:   Our goal is to review and report all test/imaging results within 24-48 hours (unless otherwise specified by the clinician). If you have not received your test results within this time period, please contact the clinic at [phone] and ask to speak to a RN Specialist or Cancer Care Coordinator.   You can also receive your test results on-line quickly and easily by enrolling in [employer] by visiting [email or web address].  Central Scheduling should contact you in 24-48 hours if any imaging is ordered during this visit. Please contact Central Scheduling at [phone] in case you do not receive a call.   Due to the recent change in narcotic laws and our commitment to patient safety and well-being, narcotic refills will be evaluated on a strict case by case basis. We will not address any requests for re-fills after noon on Fridays.   If you have paperwork, complete your portion of the paperwork and either drop off or fax the forms to [phone]. Make sure to leave clear instruction of where you would like the completed paperwork to go and include a valid phone number. It may take staff up to 7 days to complete.   2

## 2024-05-10 ENCOUNTER — EMERGENCY (EMERGENCY)
Facility: HOSPITAL | Age: 59
LOS: 0 days | Discharge: HOME | End: 2024-05-10
Attending: STUDENT IN AN ORGANIZED HEALTH CARE EDUCATION/TRAINING PROGRAM
Payer: COMMERCIAL

## 2024-05-10 VITALS — DIASTOLIC BLOOD PRESSURE: 64 MMHG | SYSTOLIC BLOOD PRESSURE: 116 MMHG | HEART RATE: 98 BPM

## 2024-05-10 VITALS
RESPIRATION RATE: 18 BRPM | DIASTOLIC BLOOD PRESSURE: 82 MMHG | TEMPERATURE: 98 F | OXYGEN SATURATION: 100 % | HEART RATE: 101 BPM | SYSTOLIC BLOOD PRESSURE: 172 MMHG

## 2024-05-10 DIAGNOSIS — Z91.013 ALLERGY TO SEAFOOD: ICD-10-CM

## 2024-05-10 DIAGNOSIS — R11.0 NAUSEA: ICD-10-CM

## 2024-05-10 DIAGNOSIS — Z87.891 PERSONAL HISTORY OF NICOTINE DEPENDENCE: ICD-10-CM

## 2024-05-10 DIAGNOSIS — Z88.7 ALLERGY STATUS TO SERUM AND VACCINE: ICD-10-CM

## 2024-05-10 DIAGNOSIS — Z88.1 ALLERGY STATUS TO OTHER ANTIBIOTIC AGENTS STATUS: ICD-10-CM

## 2024-05-10 DIAGNOSIS — E78.00 PURE HYPERCHOLESTEROLEMIA, UNSPECIFIED: ICD-10-CM

## 2024-05-10 DIAGNOSIS — Z88.0 ALLERGY STATUS TO PENICILLIN: ICD-10-CM

## 2024-05-10 DIAGNOSIS — K57.92 DIVERTICULITIS OF INTESTINE, PART UNSPECIFIED, WITHOUT PERFORATION OR ABSCESS WITHOUT BLEEDING: ICD-10-CM

## 2024-05-10 DIAGNOSIS — R10.30 LOWER ABDOMINAL PAIN, UNSPECIFIED: ICD-10-CM

## 2024-05-10 DIAGNOSIS — E03.9 HYPOTHYROIDISM, UNSPECIFIED: ICD-10-CM

## 2024-05-10 LAB
ALBUMIN SERPL ELPH-MCNC: 4.7 G/DL — SIGNIFICANT CHANGE UP (ref 3.5–5.2)
ALP SERPL-CCNC: 97 U/L — SIGNIFICANT CHANGE UP (ref 30–115)
ALT FLD-CCNC: 50 U/L — HIGH (ref 0–41)
ANION GAP SERPL CALC-SCNC: 13 MMOL/L — SIGNIFICANT CHANGE UP (ref 7–14)
APPEARANCE UR: ABNORMAL
AST SERPL-CCNC: 52 U/L — HIGH (ref 0–41)
BACTERIA # UR AUTO: ABNORMAL /HPF
BASOPHILS # BLD AUTO: 0.08 K/UL — SIGNIFICANT CHANGE UP (ref 0–0.2)
BASOPHILS NFR BLD AUTO: 0.6 % — SIGNIFICANT CHANGE UP (ref 0–1)
BILIRUB SERPL-MCNC: 0.5 MG/DL — SIGNIFICANT CHANGE UP (ref 0.2–1.2)
BILIRUB UR-MCNC: NEGATIVE — SIGNIFICANT CHANGE UP
BUN SERPL-MCNC: 17 MG/DL — SIGNIFICANT CHANGE UP (ref 10–20)
CALCIUM SERPL-MCNC: 9.6 MG/DL — SIGNIFICANT CHANGE UP (ref 8.4–10.5)
CHLORIDE SERPL-SCNC: 101 MMOL/L — SIGNIFICANT CHANGE UP (ref 98–110)
CO2 SERPL-SCNC: 23 MMOL/L — SIGNIFICANT CHANGE UP (ref 17–32)
COLOR SPEC: YELLOW — SIGNIFICANT CHANGE UP
CREAT SERPL-MCNC: 1.3 MG/DL — SIGNIFICANT CHANGE UP (ref 0.7–1.5)
DIFF PNL FLD: NEGATIVE — SIGNIFICANT CHANGE UP
EGFR: 48 ML/MIN/1.73M2 — LOW
EOSINOPHIL # BLD AUTO: 0.22 K/UL — SIGNIFICANT CHANGE UP (ref 0–0.7)
EOSINOPHIL NFR BLD AUTO: 1.7 % — SIGNIFICANT CHANGE UP (ref 0–8)
EPI CELLS # UR: PRESENT
GLUCOSE SERPL-MCNC: 157 MG/DL — HIGH (ref 70–99)
GLUCOSE UR QL: NEGATIVE MG/DL — SIGNIFICANT CHANGE UP
HCT VFR BLD CALC: 36.7 % — LOW (ref 37–47)
HGB BLD-MCNC: 12.5 G/DL — SIGNIFICANT CHANGE UP (ref 12–16)
IMM GRANULOCYTES NFR BLD AUTO: 0.4 % — HIGH (ref 0.1–0.3)
KETONES UR-MCNC: ABNORMAL MG/DL
LEUKOCYTE ESTERASE UR-ACNC: ABNORMAL
LIDOCAIN IGE QN: 26 U/L — SIGNIFICANT CHANGE UP (ref 7–60)
LYMPHOCYTES # BLD AUTO: 23.7 % — SIGNIFICANT CHANGE UP (ref 20.5–51.1)
LYMPHOCYTES # BLD AUTO: 3.14 K/UL — SIGNIFICANT CHANGE UP (ref 1.2–3.4)
MCHC RBC-ENTMCNC: 34.1 G/DL — SIGNIFICANT CHANGE UP (ref 32–37)
MCHC RBC-ENTMCNC: 35.1 PG — HIGH (ref 27–31)
MCV RBC AUTO: 103.1 FL — HIGH (ref 81–99)
MONOCYTES # BLD AUTO: 1.27 K/UL — HIGH (ref 0.1–0.6)
MONOCYTES NFR BLD AUTO: 9.6 % — HIGH (ref 1.7–9.3)
NEUTROPHILS # BLD AUTO: 8.5 K/UL — HIGH (ref 1.4–6.5)
NEUTROPHILS NFR BLD AUTO: 64 % — SIGNIFICANT CHANGE UP (ref 42.2–75.2)
NITRITE UR-MCNC: NEGATIVE — SIGNIFICANT CHANGE UP
NRBC # BLD: 0 /100 WBCS — SIGNIFICANT CHANGE UP (ref 0–0)
PH UR: 6 — SIGNIFICANT CHANGE UP (ref 5–8)
PLATELET # BLD AUTO: 261 K/UL — SIGNIFICANT CHANGE UP (ref 130–400)
PMV BLD: 10.7 FL — HIGH (ref 7.4–10.4)
POTASSIUM SERPL-MCNC: 4.8 MMOL/L — SIGNIFICANT CHANGE UP (ref 3.5–5)
POTASSIUM SERPL-SCNC: 4.8 MMOL/L — SIGNIFICANT CHANGE UP (ref 3.5–5)
PROT SERPL-MCNC: 7.6 G/DL — SIGNIFICANT CHANGE UP (ref 6–8)
PROT UR-MCNC: 30 MG/DL
RBC # BLD: 3.56 M/UL — LOW (ref 4.2–5.4)
RBC # FLD: 12.8 % — SIGNIFICANT CHANGE UP (ref 11.5–14.5)
RBC CASTS # UR COMP ASSIST: 1 /HPF — SIGNIFICANT CHANGE UP (ref 0–4)
SODIUM SERPL-SCNC: 137 MMOL/L — SIGNIFICANT CHANGE UP (ref 135–146)
SP GR SPEC: 1.02 — SIGNIFICANT CHANGE UP (ref 1–1.03)
SQUAMOUS # UR AUTO: 3 /HPF — SIGNIFICANT CHANGE UP (ref 0–5)
UROBILINOGEN FLD QL: 0.2 MG/DL — SIGNIFICANT CHANGE UP (ref 0.2–1)
WBC # BLD: 13.26 K/UL — HIGH (ref 4.8–10.8)
WBC # FLD AUTO: 13.26 K/UL — HIGH (ref 4.8–10.8)
WBC UR QL: 12 /HPF — HIGH (ref 0–5)

## 2024-05-10 PROCEDURE — 96374 THER/PROPH/DIAG INJ IV PUSH: CPT | Mod: XU

## 2024-05-10 PROCEDURE — 80053 COMPREHEN METABOLIC PANEL: CPT

## 2024-05-10 PROCEDURE — 74177 CT ABD & PELVIS W/CONTRAST: CPT | Mod: MC

## 2024-05-10 PROCEDURE — 99285 EMERGENCY DEPT VISIT HI MDM: CPT

## 2024-05-10 PROCEDURE — 85025 COMPLETE CBC W/AUTO DIFF WBC: CPT

## 2024-05-10 PROCEDURE — 74177 CT ABD & PELVIS W/CONTRAST: CPT | Mod: 26,MC

## 2024-05-10 PROCEDURE — 81001 URINALYSIS AUTO W/SCOPE: CPT

## 2024-05-10 PROCEDURE — 96375 TX/PRO/DX INJ NEW DRUG ADDON: CPT

## 2024-05-10 PROCEDURE — 83690 ASSAY OF LIPASE: CPT

## 2024-05-10 PROCEDURE — 87086 URINE CULTURE/COLONY COUNT: CPT

## 2024-05-10 PROCEDURE — 99284 EMERGENCY DEPT VISIT MOD MDM: CPT | Mod: 25

## 2024-05-10 PROCEDURE — 36415 COLL VENOUS BLD VENIPUNCTURE: CPT

## 2024-05-10 RX ORDER — MORPHINE SULFATE 50 MG/1
4 CAPSULE, EXTENDED RELEASE ORAL ONCE
Refills: 0 | Status: DISCONTINUED | OUTPATIENT
Start: 2024-05-10 | End: 2024-05-10

## 2024-05-10 RX ORDER — CIPROFLOXACIN LACTATE 400MG/40ML
1 VIAL (ML) INTRAVENOUS
Qty: 20 | Refills: 0
Start: 2024-05-10 | End: 2024-05-19

## 2024-05-10 RX ORDER — SODIUM CHLORIDE 9 MG/ML
1000 INJECTION INTRAMUSCULAR; INTRAVENOUS; SUBCUTANEOUS ONCE
Refills: 0 | Status: COMPLETED | OUTPATIENT
Start: 2024-05-10 | End: 2024-05-10

## 2024-05-10 RX ORDER — METRONIDAZOLE 500 MG
1 TABLET ORAL
Qty: 30 | Refills: 0
Start: 2024-05-10 | End: 2024-05-19

## 2024-05-10 RX ORDER — KETOROLAC TROMETHAMINE 30 MG/ML
15 SYRINGE (ML) INJECTION ONCE
Refills: 0 | Status: DISCONTINUED | OUTPATIENT
Start: 2024-05-10 | End: 2024-05-10

## 2024-05-10 RX ADMIN — MORPHINE SULFATE 4 MILLIGRAM(S): 50 CAPSULE, EXTENDED RELEASE ORAL at 13:34

## 2024-05-10 RX ADMIN — SODIUM CHLORIDE 1000 MILLILITER(S): 9 INJECTION INTRAMUSCULAR; INTRAVENOUS; SUBCUTANEOUS at 14:41

## 2024-05-10 RX ADMIN — Medication 20 MILLIGRAM(S): at 15:20

## 2024-05-10 RX ADMIN — Medication 15 MILLIGRAM(S): at 14:42

## 2024-05-10 NOTE — ED PROVIDER NOTE - PATIENT PORTAL LINK FT
You can access the FollowMyHealth Patient Portal offered by [facility] by registering at the following website: [email or web address]. By joining [employer]’s FollowMyHealth portal, you will also be able to view your health information using other applications (apps) compatible with our system.

## 2024-05-10 NOTE — ED PROVIDER NOTE - OBJECTIVE STATEMENT
58-year-old female history of high cholesterol, hypothyroid c/o crampy lower abdominal pain with nauseousness x1 week after eating sausage.  +increased pain to abdomen today.  No fevers, chills, vomiting, constipation, diarrhea, hematuria or dysuria.

## 2024-05-10 NOTE — ED PROVIDER NOTE - CLINICAL SUMMARY MEDICAL DECISION MAKING FREE TEXT BOX
58-year-old female, past medical history of hypothyroidism, pancreatitis, seizures, cholecystectomy, presenting for lower abdominal pain for the last week, nonradiating, intermittent and sharp, no alleviating or aggravating factors.  She states that 1 week ago she ate some food that made her nauseous and she is not having lower abdominal pain since.  Denies fevers, chills, vomiting, urinary symptoms, diarrhea, constipation, vaginal bleeding.  Postmenopausal since 2013.  Lower abdominal tenderness to palpation. 58-year-old female, past medical history of hypothyroidism, pancreatitis, seizures, cholecystectomy, presenting for lower abdominal pain for the last week, nonradiating, intermittent and sharp, no alleviating or aggravating factors.  She states that 1 week ago she ate some food that made her nauseous and she is not having lower abdominal pain since.  Denies fevers, chills, vomiting, urinary symptoms, diarrhea, constipation, vaginal bleeding.  Postmenopausal since 2013.  Lower abdominal tenderness to palpation. Labs were ordered and reviewed.  Imaging was ordered and reviewed by me.  Acute diverticulitis.  Appropriate medications for patient's presenting complaints were ordered and effects were reassessed.  Discussed all results with patient. Given return precautions and follow up outpatient. Patient comfortable with plan.

## 2024-05-10 NOTE — ED PROVIDER NOTE - PHYSICAL EXAMINATION
Gen: Alert, NAD, well appearing  Head: NC, AT, PERRL, EOMI, normal lids/conjunctiva  ENT: normal hearing  Neck: +supple, no tenderness/meningismus,  Pulm: Bilateral BS, normal resp effort, no wheeze/stridor/retractions  CV: RRR  Abd: soft, + lower abdominal tendnerness  Mskel: no edema/erythema/cyanosis  Skin: no rash, warm/dry  Neuro: AAOx3, no sensory/motor deficits

## 2024-05-10 NOTE — ED PROVIDER NOTE - PROVIDER TOKENS
FREE:[LAST:[your PMD],PHONE:[(   )    -],FAX:[(   )    -],FOLLOWUP:[1-3 Days]],PROVIDER:[TOKEN:[[id number]],FOLLOWUP:[1-3 Days]]

## 2024-05-10 NOTE — ED PROVIDER NOTE - CARE PROVIDER_API CALL
your PMD,   Phone: (   )    -  Fax: (   )    -  Follow Up Time: 1-3 Days    Chiqui Saul  Gastroenterology  [address] [place] [address]  Phone: [phone]  Fax: [phone]  Follow Up Time: 1-3 Days

## 2024-05-13 LAB
CULTURE RESULTS: SIGNIFICANT CHANGE UP
SPECIMEN SOURCE: SIGNIFICANT CHANGE UP

## 2024-08-19 ENCOUNTER — EMERGENCY (EMERGENCY)
Facility: HOSPITAL | Age: 59
LOS: 0 days | Discharge: LEFT BEFORE DISCHARGE | End: 2024-08-20
Attending: EMERGENCY MEDICINE
Payer: COMMERCIAL

## 2024-08-19 VITALS
OXYGEN SATURATION: 97 % | SYSTOLIC BLOOD PRESSURE: 137 MMHG | HEART RATE: 88 BPM | DIASTOLIC BLOOD PRESSURE: 90 MMHG | RESPIRATION RATE: 18 BRPM | TEMPERATURE: 97 F

## 2024-08-19 DIAGNOSIS — R10.13 EPIGASTRIC PAIN: ICD-10-CM

## 2024-08-19 DIAGNOSIS — Z91.041 RADIOGRAPHIC DYE ALLERGY STATUS: ICD-10-CM

## 2024-08-19 DIAGNOSIS — R11.0 NAUSEA: ICD-10-CM

## 2024-08-19 DIAGNOSIS — Z91.013 ALLERGY TO SEAFOOD: ICD-10-CM

## 2024-08-19 DIAGNOSIS — Z88.0 ALLERGY STATUS TO PENICILLIN: ICD-10-CM

## 2024-08-19 DIAGNOSIS — E78.2 MIXED HYPERLIPIDEMIA: ICD-10-CM

## 2024-08-19 DIAGNOSIS — R10.9 UNSPECIFIED ABDOMINAL PAIN: ICD-10-CM

## 2024-08-19 DIAGNOSIS — R51.9 HEADACHE, UNSPECIFIED: ICD-10-CM

## 2024-08-19 DIAGNOSIS — Z87.19 PERSONAL HISTORY OF OTHER DISEASES OF THE DIGESTIVE SYSTEM: ICD-10-CM

## 2024-08-19 DIAGNOSIS — Z88.1 ALLERGY STATUS TO OTHER ANTIBIOTIC AGENTS: ICD-10-CM

## 2024-08-19 DIAGNOSIS — R10.814 LEFT LOWER QUADRANT ABDOMINAL TENDERNESS: ICD-10-CM

## 2024-08-19 DIAGNOSIS — Z53.29 PROCEDURE AND TREATMENT NOT CARRIED OUT BECAUSE OF PATIENT'S DECISION FOR OTHER REASONS: ICD-10-CM

## 2024-08-19 DIAGNOSIS — E11.65 TYPE 2 DIABETES MELLITUS WITH HYPERGLYCEMIA: ICD-10-CM

## 2024-08-19 DIAGNOSIS — R07.9 CHEST PAIN, UNSPECIFIED: ICD-10-CM

## 2024-08-19 DIAGNOSIS — Z88.7 ALLERGY STATUS TO SERUM AND VACCINE: ICD-10-CM

## 2024-08-19 DIAGNOSIS — I10 ESSENTIAL (PRIMARY) HYPERTENSION: ICD-10-CM

## 2024-08-19 LAB
ALBUMIN SERPL ELPH-MCNC: 3.4 G/DL — LOW (ref 3.5–5.2)
ALP SERPL-CCNC: 107 U/L — SIGNIFICANT CHANGE UP (ref 30–115)
ALT FLD-CCNC: 5 U/L — SIGNIFICANT CHANGE UP (ref 0–41)
ANION GAP SERPL CALC-SCNC: 13 MMOL/L — SIGNIFICANT CHANGE UP (ref 7–14)
AST SERPL-CCNC: 5 U/L — SIGNIFICANT CHANGE UP (ref 0–41)
BILIRUB SERPL-MCNC: <0.2 MG/DL — SIGNIFICANT CHANGE UP (ref 0.2–1.2)
BUN SERPL-MCNC: 8 MG/DL — LOW (ref 10–20)
CALCIUM SERPL-MCNC: 3.2 MG/DL — CRITICAL LOW (ref 8.4–10.5)
CHLORIDE SERPL-SCNC: 80 MMOL/L — LOW (ref 98–110)
CO2 SERPL-SCNC: 22 MMOL/L — SIGNIFICANT CHANGE UP (ref 17–32)
CREAT SERPL-MCNC: 1.7 MG/DL — HIGH (ref 0.7–1.5)
EGFR: 35 ML/MIN/1.73M2 — LOW
GLUCOSE SERPL-MCNC: 380 MG/DL — HIGH (ref 70–99)
MAGNESIUM SERPL-MCNC: 2 MG/DL — SIGNIFICANT CHANGE UP (ref 1.8–2.4)
POTASSIUM SERPL-MCNC: 3.6 MMOL/L — SIGNIFICANT CHANGE UP (ref 3.5–5)
POTASSIUM SERPL-SCNC: 3.6 MMOL/L — SIGNIFICANT CHANGE UP (ref 3.5–5)
PROT SERPL-MCNC: 5.7 G/DL — LOW (ref 6–8)
SODIUM SERPL-SCNC: 115 MMOL/L — CRITICAL LOW (ref 135–146)
TROPONIN T, HIGH SENSITIVITY RESULT: <6 NG/L — SIGNIFICANT CHANGE UP (ref 6–13)

## 2024-08-19 PROCEDURE — 96375 TX/PRO/DX INJ NEW DRUG ADDON: CPT

## 2024-08-19 PROCEDURE — 96374 THER/PROPH/DIAG INJ IV PUSH: CPT | Mod: XU

## 2024-08-19 PROCEDURE — 71045 X-RAY EXAM CHEST 1 VIEW: CPT

## 2024-08-19 PROCEDURE — 93010 ELECTROCARDIOGRAM REPORT: CPT

## 2024-08-19 PROCEDURE — 71045 X-RAY EXAM CHEST 1 VIEW: CPT | Mod: 26

## 2024-08-19 PROCEDURE — 93005 ELECTROCARDIOGRAM TRACING: CPT

## 2024-08-19 PROCEDURE — 99285 EMERGENCY DEPT VISIT HI MDM: CPT | Mod: 25

## 2024-08-19 PROCEDURE — 74177 CT ABD & PELVIS W/CONTRAST: CPT | Mod: MC

## 2024-08-19 PROCEDURE — 80053 COMPREHEN METABOLIC PANEL: CPT

## 2024-08-19 PROCEDURE — 83721 ASSAY OF BLOOD LIPOPROTEIN: CPT

## 2024-08-19 PROCEDURE — 83735 ASSAY OF MAGNESIUM: CPT

## 2024-08-19 PROCEDURE — 84484 ASSAY OF TROPONIN QUANT: CPT

## 2024-08-19 PROCEDURE — 83690 ASSAY OF LIPASE: CPT

## 2024-08-19 PROCEDURE — 99285 EMERGENCY DEPT VISIT HI MDM: CPT

## 2024-08-19 PROCEDURE — 80061 LIPID PANEL: CPT

## 2024-08-19 PROCEDURE — 36415 COLL VENOUS BLD VENIPUNCTURE: CPT

## 2024-08-19 PROCEDURE — 85025 COMPLETE CBC W/AUTO DIFF WBC: CPT

## 2024-08-19 PROCEDURE — 74177 CT ABD & PELVIS W/CONTRAST: CPT | Mod: 26,MC

## 2024-08-19 RX ORDER — FAMOTIDINE 40 MG/1
20 TABLET, FILM COATED ORAL ONCE
Refills: 0 | Status: COMPLETED | OUTPATIENT
Start: 2024-08-19 | End: 2024-08-19

## 2024-08-19 RX ORDER — DIPHENHYDRAMINE HCL 25 MG
50 CAPSULE ORAL ONCE
Refills: 0 | Status: COMPLETED | OUTPATIENT
Start: 2024-08-19 | End: 2024-08-19

## 2024-08-19 RX ORDER — ONDANSETRON HCL/PF 4 MG/2 ML
4 VIAL (ML) INJECTION ONCE
Refills: 0 | Status: COMPLETED | OUTPATIENT
Start: 2024-08-19 | End: 2024-08-19

## 2024-08-19 RX ORDER — BACTERIOSTATIC SODIUM CHLORIDE 0.9 %
1000 VIAL (ML) INJECTION ONCE
Refills: 0 | Status: COMPLETED | OUTPATIENT
Start: 2024-08-19 | End: 2024-08-19

## 2024-08-19 RX ADMIN — FAMOTIDINE 100 MILLIGRAM(S): 40 TABLET, FILM COATED ORAL at 23:24

## 2024-08-19 RX ADMIN — Medication 4 MILLIGRAM(S): at 22:03

## 2024-08-19 RX ADMIN — Medication 4 MILLIGRAM(S): at 22:02

## 2024-08-19 RX ADMIN — Medication 50 MILLIGRAM(S): at 22:22

## 2024-08-19 RX ADMIN — Medication 1000 MILLILITER(S): at 22:02

## 2024-08-19 NOTE — ED PROVIDER NOTE - CLINICAL SUMMARY MEDICAL DECISION MAKING FREE TEXT BOX
58yF DM, hyperTG,  pw 3 days of  headache  intermittent Chest pain and burning epigastric abdominal pain raditing to throat with  bad taste.Pt currently without  chest pain   Pt says this all started after she found out she was fired from her job.   Pt nontoxic   no physical  distress, no jaundice, abd soft mild ttp llq  no rebound or guarding 2+ radial pulse bl equal .  labs significant for  Hyper TG  > 4000,  lipase normal CTAP  no pancreatitis.  I  Dr Nelson performed independent interpretation of EKG  -  nsr no stemi no acute ischemia  independent interpretation,   by myself Dr Nelson, of CXR -  no ptx no opacity not wide  no water bottle heart  trop < 6  dw Intensivist  Plan for admission to  floor with IVF  and gemfibrozil   dw patient she wants to  leave [facility] to take care of her dog  and then return as soon as she  gets  someone to watch her.   patient still electing to leave against medical advice. Patient is awake, alert, oriented and demonstrates full capacity and insight into illness. Patient aware and encouraged to return immediately to ED or nearest ED if patient decides to change mind regarding care or if patient experiences any new, worsening, or concerning symptoms. Pt understands  she is taking the risk of seizure coma death  . pt understands this and says she will return as soon as she can

## 2024-08-19 NOTE — ED PROVIDER NOTE - OBJECTIVE STATEMENT
58 year old female past medical history of HLD, Hypertension, diverticulitis comes to emergency room for abdominal pain with nausea for the last 3 days. patient states that she was fired from her job three days ago and since has been feeling not well. patient states stomach pain has progressively gotten worse. no nausea, vomiting, diarrhea. patient states that she is also having on and off chest pain for the last 3 months.

## 2024-08-19 NOTE — ED PROVIDER NOTE - CARE PLAN
Principal Discharge DX:	Elevated cholesterol with high triglycerides  Secondary Diagnosis:	Hyperglycemia  Secondary Diagnosis:	Chest pain  Secondary Diagnosis:	Abdominal pain   1

## 2024-08-19 NOTE — ED PROVIDER NOTE - PATIENT PORTAL LINK FT
You can access the FollowMyHealth Patient Portal offered by [facility] by registering at the following website: [email or web address]. By joining [employer]s FollowMyHealth portal, you will also be able to view your health information using other applications (apps) compatible with our system.

## 2024-08-19 NOTE — ED PROVIDER NOTE - HIV OFFER
Patient has H/O hyperlipidemia  Was on atorvastatin 80 mg at home  No chest pain, sob    Plan:  Continue Atorvastatin 80 mg   Lipid panel ordered Previously Declined (within the last year)

## 2024-08-19 NOTE — ED PROVIDER NOTE - NSFOLLOWUPINSTRUCTIONS_ED_ALL_ED_FT
FOLLOW UP WITH YOUR PRIMARY CARE DOCTOR AS SOON AS POSSIBLE OR COME BACK TO ER FOR ANY CONCERNS.     Chest Pain    WHAT YOU NEED TO KNOW:    Chest pain can be caused by a range of conditions, from not serious to life-threatening. Chest pain can be a symptom of a digestive problem, such as acid reflux or a stomach ulcer. An anxiety attack or a strong emotion, such as anger, can also cause chest pain. Infection, inflammation, or a fracture in the bones or cartilage in your chest can cause pain or discomfort. Sometimes chest pain or pressure is caused by poor blood flow to your heart (angina). Chest pain may also be caused by life-threatening conditions such as a heart attack or blood clot in your lungs.     DISCHARGE INSTRUCTIONS:    Call 911 if:     You have any of the following signs of a heart attack:   Squeezing, pressure, or pain in your chest       and any of the following:   Discomfort or pain in your back, neck, jaw, stomach, or arm       Shortness of breath      Nausea or vomiting      Lightheadedness or a sudden cold sweat        Return to the emergency department if:     You have chest discomfort that gets worse, even with medicine.      You cough or vomit blood.       Your bowel movements are black or bloody.       You cannot stop vomiting, or it hurts to swallow.     Contact your healthcare provider if:     You have questions or concerns about your condition or care.        Medicines:     Medicines may be given to treat the cause of your chest pain. Examples include pain medicine, anxiety medicine, or medicines to increase blood flow to your heart.       Do not take certain medicines without asking your healthcare provider first. These include NSAIDs, herbal or vitamin supplements, or hormones (estrogen or progestin).       Take your medicine as directed. Contact your healthcare provider if you think your medicine is not helping or if you have side effects. Tell him or her if you are allergic to any medicine. Keep a list of the medicines, vitamins, and herbs you take. Include the amounts, and when and why you take them. Bring the list or the pill bottles to follow-up visits. Carry your medicine list with you in case of an emergency.    Follow up with your healthcare provider within 72 hours, or as directed: You may need to return for more tests to find the cause of your chest pain. You may be referred to a specialist, such as a cardiologist or gastroenterologist. Write down your questions so you remember to ask them during your visits.     Healthy living tips: The following are general healthy guidelines. If your chest pain is caused by a heart problem, your healthcare provider will give you specific guidelines to follow.    Do not smoke. Nicotine and other chemicals in cigarettes and cigars can cause lung and heart damage. Ask your healthcare provider for information if you currently smoke and need help to quit. E-cigarettes or smokeless tobacco still contain nicotine. Talk to your healthcare provider before you use these products.       Eat a variety of healthy, low-fat, low-salt foods. Healthy foods include fruits, vegetables, whole-grain breads, low-fat dairy products, beans, lean meats, and fish. Ask for more information about a heart healthy diet.      Drink plenty of water every day. Your body is made of mostly water. Water helps your body to control your temperature and blood pressure. Ask your healthcare provider how much water you should drink every day.      Ask about activity. Your healthcare provider will tell you which activities to limit or avoid. Ask when you can drive, return to work, and have sex. Ask about the best exercise plan for you.      Maintain a healthy weight. Ask your healthcare provider how much you should weigh. Ask him or her to help you create a weight loss plan if you are overweight.       Get the flu and pneumonia vaccines. All adults should get the influenza (flu) vaccine. Get it every year as soon as it becomes available. The pneumococcal vaccine is given to adults aged 65 years or older. The vaccine is given every 5 years to prevent pneumococcal disease, such as pneumonia.    If you have a stent:     Carry your stent card with you at all times.       Let all healthcare providers know that you have a stent.          © Copyright [employer] 2019 All illustrations and images included in CareNotes are the copyrighted property of [employer] or [employer].        Acute Abdominal Pain    WHAT YOU NEED TO KNOW:    The cause of your abdominal pain may not be found. If a cause is found, treatment will depend on what the cause is.     DISCHARGE INSTRUCTIONS:    Return to the emergency department if:     You vomit blood or cannot stop vomiting.      You have blood in your bowel movement or it looks like tar.       You have bleeding from your rectum.       Your abdomen is larger than usual, more painful, and hard.       You have severe pain in your abdomen.       You stop passing gas and having bowel movements.       You feel weak, dizzy, or faint.    Contact your healthcare provider if:     You have a fever.      You have new signs and symptoms.      Your symptoms do not get better with treatment.       You have questions or concerns about your condition or care.    Medicines may be given to decrease pain, treat an infection, and manage your symptoms. Take your medicine as directed. Call your healthcare provider if you think your medicine is not helping or if you have side effects. Tell him if you are allergic to any medicine. Keep a list of the medicines, vitamins, and herbs you take. Include the amounts, and when and why you take them. Bring the list or the pill bottles to follow-up visits. Carry your medicine list with you in case of an emergency.    Manage your symptoms:     Apply heat on your abdomen for 20 to 30 minutes every 2 hours for as many days as directed. Heat helps decrease pain and muscle spasms.       Manage your stress. Stress may cause abdominal pain. Your healthcare provider may recommend relaxation techniques and deep breathing exercises to help decrease your stress. Your healthcare provider may recommend you talk to someone about your stress or anxiety, such as a counselor or a trusted friend. Get plenty of sleep and exercise regularly.       Limit or do not drink alcohol. Alcohol can make your abdominal pain worse. Ask your healthcare provider if it is safe for you to drink alcohol. Also ask how much is safe for you to drink.       Do not smoke. Nicotine and other chemicals in cigarettes can damage your esophagus and stomach. Ask your healthcare provider for information if you currently smoke and need help to quit. E-cigarettes or smokeless tobacco still contain nicotine. Talk to your healthcare provider before you use these products.     Make changes to the food you eat as directed: Do not eat foods that cause abdominal pain or other symptoms. Eat small meals more often.     Eat more high-fiber foods if you are constipated. High-fiber foods include fruits, vegetables, whole-grain foods, and legumes.       Do not eat foods that cause gas if you have bloating. Examples include broccoli, cabbage, and cauliflower. Do not drink soda or carbonated drinks, because these may also cause gas.       Do not eat foods or drinks that contain sorbitol or fructose if you have diarrhea and bloating. Some examples are fruit juices, candy, jelly, and sugar-free gum.       Do not eat high-fat foods, such as fried foods, cheeseburgers, hot dogs, and desserts.      Limit or do not drink caffeine. Caffeine may make symptoms, such as heart burn or nausea, worse.       Drink plenty of liquids to prevent dehydration from diarrhea or vomiting. Ask your healthcare provider how much liquid to drink each day and which liquids are best for you.     Follow up with your healthcare provider as directed: Write down your questions so you remember to ask them during your visits.       © Copyright [employer] 2019 All illustrations and images included in CareNotes are the copyrighted property of [employer] or [employer]

## 2024-08-19 NOTE — ED PROVIDER NOTE - PROGRESS NOTE DETAILS
D/w patient all results and need for admission to hospital. patient refusing at this time. patient states she has dog at home and no one to take care of it. patient states she will go home and make arrangment for the dog and than come back tomorrow. patient aware of critical lab values including high blood sugar and triglycerides and that failure to properly treat could lead to pancreaititis, heart attack, stroke death or disability. patient verbalizes understanding and wants to sign AMA. patient states she has all her meds at home and does not need gimfibrozl here.

## 2024-08-20 ENCOUNTER — INPATIENT (INPATIENT)
Facility: HOSPITAL | Age: 59
LOS: 2 days | Discharge: HOME HEALTH | DRG: 642 | End: 2024-08-23
Attending: INTERNAL MEDICINE | Admitting: INTERNAL MEDICINE
Payer: COMMERCIAL

## 2024-08-20 VITALS
OXYGEN SATURATION: 99 % | HEIGHT: 67 IN | TEMPERATURE: 99 F | WEIGHT: 199.96 LBS | HEART RATE: 96 BPM | DIASTOLIC BLOOD PRESSURE: 87 MMHG | RESPIRATION RATE: 22 BRPM | SYSTOLIC BLOOD PRESSURE: 117 MMHG

## 2024-08-20 VITALS
HEART RATE: 84 BPM | DIASTOLIC BLOOD PRESSURE: 84 MMHG | SYSTOLIC BLOOD PRESSURE: 131 MMHG | WEIGHT: 160.06 LBS | HEIGHT: 67 IN

## 2024-08-20 LAB
ALBUMIN SERPL ELPH-MCNC: 3 G/DL — LOW (ref 3.5–5.2)
ALBUMIN SERPL ELPH-MCNC: 3.1 G/DL — LOW (ref 3.5–5.2)
ALP SERPL-CCNC: 105 U/L — SIGNIFICANT CHANGE UP (ref 30–115)
ALP SERPL-CCNC: 98 U/L — SIGNIFICANT CHANGE UP (ref 30–115)
ALT FLD-CCNC: 5 U/L — SIGNIFICANT CHANGE UP (ref 0–41)
ALT FLD-CCNC: 5 U/L — SIGNIFICANT CHANGE UP (ref 0–41)
ANION GAP SERPL CALC-SCNC: 13 MMOL/L — SIGNIFICANT CHANGE UP (ref 7–14)
ANION GAP SERPL CALC-SCNC: 17 MMOL/L — HIGH (ref 7–14)
APPEARANCE UR: CLEAR — SIGNIFICANT CHANGE UP
AST SERPL-CCNC: 5 U/L — SIGNIFICANT CHANGE UP (ref 0–41)
AST SERPL-CCNC: 5 U/L — SIGNIFICANT CHANGE UP (ref 0–41)
BASE EXCESS BLDV CALC-SCNC: -1.7 MMOL/L — SIGNIFICANT CHANGE UP (ref -2–3)
BASOPHILS # BLD AUTO: 0.07 K/UL — SIGNIFICANT CHANGE UP (ref 0–0.2)
BASOPHILS # BLD AUTO: 0.13 K/UL — SIGNIFICANT CHANGE UP (ref 0–0.2)
BASOPHILS NFR BLD AUTO: 1.2 % — HIGH (ref 0–1)
BASOPHILS NFR BLD AUTO: 2 % — HIGH (ref 0–1)
BILIRUB DIRECT SERPL-MCNC: 0.6 MG/DL — HIGH (ref 0–0.3)
BILIRUB INDIRECT FLD-MCNC: <-0.4 MG/DL — LOW (ref 0.2–1.2)
BILIRUB SERPL-MCNC: <0.2 MG/DL — SIGNIFICANT CHANGE UP (ref 0.2–1.2)
BILIRUB SERPL-MCNC: <0.2 MG/DL — SIGNIFICANT CHANGE UP (ref 0.2–1.2)
BILIRUB UR-MCNC: NEGATIVE — SIGNIFICANT CHANGE UP
BUN SERPL-MCNC: 11 MG/DL — SIGNIFICANT CHANGE UP (ref 10–20)
BUN SERPL-MCNC: <3 MG/DL — LOW (ref 10–20)
CA-I SERPL-SCNC: 1.12 MMOL/L — LOW (ref 1.15–1.33)
CALCIUM SERPL-MCNC: 5.4 MG/DL — CRITICAL LOW (ref 8.4–10.5)
CALCIUM SERPL-MCNC: 5.7 MG/DL — CRITICAL LOW (ref 8.4–10.5)
CHLORIDE SERPL-SCNC: 80 MMOL/L — LOW (ref 98–110)
CHLORIDE SERPL-SCNC: 82 MMOL/L — LOW (ref 98–110)
CHOLEST SERPL-MCNC: 1456 MG/DL — HIGH
CHOLEST SERPL-MCNC: 1536 MG/DL — HIGH
CO2 SERPL-SCNC: 17 MMOL/L — SIGNIFICANT CHANGE UP (ref 17–32)
CO2 SERPL-SCNC: 20 MMOL/L — SIGNIFICANT CHANGE UP (ref 17–32)
COLOR SPEC: ABNORMAL
CREAT SERPL-MCNC: <0.5 MG/DL — LOW (ref 0.7–1.5)
CREAT SERPL-MCNC: <0.5 MG/DL — LOW (ref 0.7–1.5)
DIFF PNL FLD: NEGATIVE — SIGNIFICANT CHANGE UP
EGFR: 109 ML/MIN/1.73M2 — SIGNIFICANT CHANGE UP
EGFR: 136 ML/MIN/1.73M2 — SIGNIFICANT CHANGE UP
EOSINOPHIL # BLD AUTO: 0.17 K/UL — SIGNIFICANT CHANGE UP (ref 0–0.7)
EOSINOPHIL NFR BLD AUTO: 0 % — SIGNIFICANT CHANGE UP (ref 0–8)
EOSINOPHIL NFR BLD AUTO: 2.9 % — SIGNIFICANT CHANGE UP (ref 0–8)
GAS PNL BLDV: 128 MMOL/L — LOW (ref 136–145)
GAS PNL BLDV: SIGNIFICANT CHANGE UP
GAS PNL BLDV: SIGNIFICANT CHANGE UP
GLUCOSE BLDC GLUCOMTR-MCNC: 477 MG/DL — CRITICAL HIGH (ref 70–99)
GLUCOSE BLDC GLUCOMTR-MCNC: 482 MG/DL — CRITICAL HIGH (ref 70–99)
GLUCOSE SERPL-MCNC: 472 MG/DL — CRITICAL HIGH (ref 70–99)
GLUCOSE SERPL-MCNC: 493 MG/DL — CRITICAL HIGH (ref 70–99)
GLUCOSE UR QL: >=1000 MG/DL
HCO3 BLDV-SCNC: 22 MMOL/L — SIGNIFICANT CHANGE UP (ref 22–29)
HCT VFR BLD CALC: 36.6 % — LOW (ref 37–47)
HCT VFR BLD CALC: 36.9 % — LOW (ref 37–47)
HCT VFR BLDA CALC: 44 % — SIGNIFICANT CHANGE UP (ref 34.5–46.5)
HDLC SERPL-MCNC: <3 MG/DL — LOW
HDLC SERPL-MCNC: <3 MG/DL — LOW
HGB BLD-MCNC: 12.6 G/DL — SIGNIFICANT CHANGE UP (ref 12–16)
HGB BLD-MCNC: 16.4 G/DL — HIGH (ref 12–16)
IMM GRANULOCYTES NFR BLD AUTO: 0.9 % — HIGH (ref 0.1–0.3)
KETONES UR-MCNC: 15 MG/DL
LACTATE BLDV-MCNC: 2.2 MMOL/L — HIGH (ref 0.5–2)
LACTATE SERPL-SCNC: 1.8 MMOL/L — SIGNIFICANT CHANGE UP (ref 0.7–2)
LDLC SERPL DIRECT ASSAY-MCNC: 21 MG/DL — SIGNIFICANT CHANGE UP
LDLC SERPL DIRECT ASSAY-MCNC: SIGNIFICANT CHANGE UP MG/DL
LEUKOCYTE ESTERASE UR-ACNC: NEGATIVE — SIGNIFICANT CHANGE UP
LIDOCAIN IGE QN: 31 U/L — SIGNIFICANT CHANGE UP (ref 7–60)
LIDOCAIN IGE QN: 34 U/L — SIGNIFICANT CHANGE UP (ref 7–60)
LIPID PNL WITH DIRECT LDL SERPL: ABNORMAL
LIPID PNL WITH DIRECT LDL SERPL: ABNORMAL
LYMPHOCYTES # BLD AUTO: 2.47 K/UL — SIGNIFICANT CHANGE UP (ref 1.2–3.4)
LYMPHOCYTES # BLD AUTO: 2.71 K/UL — SIGNIFICANT CHANGE UP (ref 1.2–3.4)
LYMPHOCYTES # BLD AUTO: 41 % — SIGNIFICANT CHANGE UP (ref 20.5–51.1)
LYMPHOCYTES # BLD AUTO: 42.2 % — SIGNIFICANT CHANGE UP (ref 20.5–51.1)
MANUAL SMEAR VERIFICATION: SIGNIFICANT CHANGE UP
MCHC RBC-ENTMCNC: 32.1 PG — HIGH (ref 27–31)
MCHC RBC-ENTMCNC: 34.1 G/DL — SIGNIFICANT CHANGE UP (ref 32–37)
MCHC RBC-ENTMCNC: 42.1 PG — HIGH (ref 27–31)
MCHC RBC-ENTMCNC: 44.8 G/DL — HIGH (ref 32–37)
MCV RBC AUTO: 93.8 FL — SIGNIFICANT CHANGE UP (ref 81–99)
MCV RBC AUTO: 93.9 FL — SIGNIFICANT CHANGE UP (ref 81–99)
MONOCYTES # BLD AUTO: 0.4 K/UL — SIGNIFICANT CHANGE UP (ref 0.1–0.6)
MONOCYTES # BLD AUTO: 0.46 K/UL — SIGNIFICANT CHANGE UP (ref 0.1–0.6)
MONOCYTES NFR BLD AUTO: 6.8 % — SIGNIFICANT CHANGE UP (ref 1.7–9.3)
MONOCYTES NFR BLD AUTO: 7 % — SIGNIFICANT CHANGE UP (ref 1.7–9.3)
NEUTROPHILS # BLD AUTO: 2.7 K/UL — SIGNIFICANT CHANGE UP (ref 1.4–6.5)
NEUTROPHILS # BLD AUTO: 3.31 K/UL — SIGNIFICANT CHANGE UP (ref 1.4–6.5)
NEUTROPHILS NFR BLD AUTO: 46 % — SIGNIFICANT CHANGE UP (ref 42.2–75.2)
NEUTROPHILS NFR BLD AUTO: 50 % — SIGNIFICANT CHANGE UP (ref 42.2–75.2)
NITRITE UR-MCNC: NEGATIVE — SIGNIFICANT CHANGE UP
NON HDL CHOLESTEROL: >1453 MG/DL — HIGH
NON HDL CHOLESTEROL: >1533 MG/DL — HIGH
NRBC # BLD: 0 /100 WBCS — SIGNIFICANT CHANGE UP (ref 0–0)
NRBC # BLD: 0 /100 WBCS — SIGNIFICANT CHANGE UP (ref 0–0)
NRBC # BLD: SIGNIFICANT CHANGE UP /100 WBCS (ref 0–0)
PCO2 BLDV: 34 MMHG — LOW (ref 39–42)
PH BLDV: 7.42 — SIGNIFICANT CHANGE UP (ref 7.32–7.43)
PH UR: 6 — SIGNIFICANT CHANGE UP (ref 5–8)
PLAT MORPH BLD: NORMAL — SIGNIFICANT CHANGE UP
PLATELET # BLD AUTO: 187 K/UL — SIGNIFICANT CHANGE UP (ref 130–400)
PLATELET # BLD AUTO: 192 K/UL — SIGNIFICANT CHANGE UP (ref 130–400)
PLATELET COUNT - ESTIMATE: NORMAL — SIGNIFICANT CHANGE UP
PMV BLD: 12 FL — HIGH (ref 7.4–10.4)
PMV BLD: 12.5 FL — HIGH (ref 7.4–10.4)
PO2 BLDV: 64 MMHG — HIGH (ref 25–45)
POTASSIUM BLDV-SCNC: 4 MMOL/L — SIGNIFICANT CHANGE UP (ref 3.5–5.1)
POTASSIUM SERPL-MCNC: 3.6 MMOL/L — SIGNIFICANT CHANGE UP (ref 3.5–5)
POTASSIUM SERPL-MCNC: 3.6 MMOL/L — SIGNIFICANT CHANGE UP (ref 3.5–5)
POTASSIUM SERPL-SCNC: 3.6 MMOL/L — SIGNIFICANT CHANGE UP (ref 3.5–5)
POTASSIUM SERPL-SCNC: 3.6 MMOL/L — SIGNIFICANT CHANGE UP (ref 3.5–5)
PROT SERPL-MCNC: 5.3 G/DL — LOW (ref 6–8)
PROT SERPL-MCNC: 5.5 G/DL — LOW (ref 6–8)
PROT UR-MCNC: 30 MG/DL
RBC # BLD: 3.9 M/UL — LOW (ref 4.2–5.4)
RBC # BLD: 3.93 M/UL — LOW (ref 4.2–5.4)
RBC # FLD: 13.5 % — SIGNIFICANT CHANGE UP (ref 11.5–14.5)
RBC # FLD: 13.9 % — SIGNIFICANT CHANGE UP (ref 11.5–14.5)
RBC BLD AUTO: NORMAL — SIGNIFICANT CHANGE UP
SODIUM SERPL-SCNC: 114 MMOL/L — CRITICAL LOW (ref 135–146)
SODIUM SERPL-SCNC: 115 MMOL/L — CRITICAL LOW (ref 135–146)
SP GR SPEC: >1.03 — HIGH (ref 1–1.03)
TRIGL SERPL-MCNC: >4425 MG/DL — HIGH
TRIGL SERPL-MCNC: >4425 MG/DL — HIGH
TROPONIN T, HIGH SENSITIVITY RESULT: <6 NG/L — SIGNIFICANT CHANGE UP (ref 6–13)
UROBILINOGEN FLD QL: 0.2 MG/DL — SIGNIFICANT CHANGE UP (ref 0.2–1)
WBC # BLD: 5.86 K/UL — SIGNIFICANT CHANGE UP (ref 4.8–10.8)
WBC # BLD: 6.61 K/UL — SIGNIFICANT CHANGE UP (ref 4.8–10.8)
WBC # FLD AUTO: 5.86 K/UL — SIGNIFICANT CHANGE UP (ref 4.8–10.8)
WBC # FLD AUTO: 6.61 K/UL — SIGNIFICANT CHANGE UP (ref 4.8–10.8)

## 2024-08-20 PROCEDURE — 99291 CRITICAL CARE FIRST HOUR: CPT

## 2024-08-20 PROCEDURE — 71045 X-RAY EXAM CHEST 1 VIEW: CPT | Mod: 26

## 2024-08-20 PROCEDURE — 93010 ELECTROCARDIOGRAM REPORT: CPT

## 2024-08-20 RX ORDER — BACTERIOSTATIC SODIUM CHLORIDE 0.9 %
1000 VIAL (ML) INJECTION
Refills: 0 | Status: DISCONTINUED | OUTPATIENT
Start: 2024-08-20 | End: 2024-08-20

## 2024-08-20 RX ORDER — HEPARIN SODIUM,BOVINE 1000/ML
VIAL (ML) INJECTION
Qty: 25000 | Refills: 0 | Status: DISCONTINUED | OUTPATIENT
Start: 2024-08-20 | End: 2024-08-21

## 2024-08-20 RX ORDER — FAMOTIDINE 10 MG/ML
20 INJECTION INTRAVENOUS ONCE
Refills: 0 | Status: COMPLETED | OUTPATIENT
Start: 2024-08-20 | End: 2024-08-20

## 2024-08-20 RX ORDER — GEMFIBROZIL 600 MG/1
600 TABLET ORAL
Refills: 0 | Status: DISCONTINUED | OUTPATIENT
Start: 2024-08-20 | End: 2024-08-20

## 2024-08-20 RX ORDER — INSULIN REGULAR, HUMAN 100/ML (3)
9 INSULIN PEN (ML) SUBCUTANEOUS
Qty: 100 | Refills: 0 | Status: DISCONTINUED | OUTPATIENT
Start: 2024-08-20 | End: 2024-08-21

## 2024-08-20 RX ORDER — SODIUM CHLORIDE 9 MG/ML
1000 INJECTION INTRAMUSCULAR; INTRAVENOUS; SUBCUTANEOUS ONCE
Refills: 0 | Status: COMPLETED | OUTPATIENT
Start: 2024-08-20 | End: 2024-08-20

## 2024-08-20 RX ADMIN — Medication 9 UNIT(S)/HR: at 23:54

## 2024-08-20 RX ADMIN — SODIUM CHLORIDE 1000 MILLILITER(S): 9 INJECTION INTRAMUSCULAR; INTRAVENOUS; SUBCUTANEOUS at 21:42

## 2024-08-20 RX ADMIN — Medication 4 MILLIGRAM(S): at 21:42

## 2024-08-20 RX ADMIN — SODIUM CHLORIDE 1000 MILLILITER(S): 9 INJECTION INTRAMUSCULAR; INTRAVENOUS; SUBCUTANEOUS at 23:24

## 2024-08-20 RX ADMIN — Medication 4 MILLIGRAM(S): at 23:24

## 2024-08-20 RX ADMIN — FAMOTIDINE 100 MILLIGRAM(S): 10 INJECTION INTRAVENOUS at 21:42

## 2024-08-20 RX ADMIN — FAMOTIDINE 20 MILLIGRAM(S): 10 INJECTION INTRAVENOUS at 23:24

## 2024-08-20 RX ADMIN — Medication 4 MILLIGRAM(S): at 01:19

## 2024-08-20 NOTE — ED ADULT NURSE NOTE - NSFALLUNIVINTERV_ED_ALL_ED
Bed/Stretcher in lowest position, wheels locked, appropriate side rails in place/Call bell, personal items and telephone in reach/Instruct patient to call for assistance before getting out of bed/chair/stretcher/Non-slip footwear applied when patient is off stretcher/[place] to call system/Physically safe environment - no spills, clutter or unnecessary equipment/Purposeful proactive rounding/Room/bathroom lighting operational, light cord in reach

## 2024-08-20 NOTE — ED ADULT NURSE NOTE - NSFALLUNIVINTERV_ED_ALL_ED
Bed/Stretcher in lowest position, wheels locked, appropriate side rails in place/Call bell, personal items and telephone in reach/Instruct patient to call for assistance before getting out of bed/chair/stretcher/Non-slip footwear applied when patient is off stretcher/[place] to call system/Physically safe environment - no spills, clutter or unnecessary equipment/Purposeful proactive rounding/Room/bathroom lighting operational, light cord in reach

## 2024-08-21 DIAGNOSIS — E78.1 PURE HYPERGLYCERIDEMIA: ICD-10-CM

## 2024-08-21 DIAGNOSIS — Z90.49 ACQUIRED ABSENCE OF OTHER SPECIFIED PARTS OF DIGESTIVE TRACT: Chronic | ICD-10-CM

## 2024-08-21 LAB
A1C WITH ESTIMATED AVERAGE GLUCOSE RESULT: 12.3 % — HIGH (ref 4–5.6)
ALBUMIN SERPL ELPH-MCNC: 2.8 G/DL — LOW (ref 3.5–5.2)
ALP SERPL-CCNC: 88 U/L — SIGNIFICANT CHANGE UP (ref 30–115)
ALT FLD-CCNC: 5 U/L — SIGNIFICANT CHANGE UP (ref 0–41)
AMORPH SED URNS QL MICRO: PRESENT
ANION GAP SERPL CALC-SCNC: 15 MMOL/L — HIGH (ref 7–14)
ANION GAP SERPL CALC-SCNC: 9 MMOL/L — SIGNIFICANT CHANGE UP (ref 7–14)
AST SERPL-CCNC: 5 U/L — SIGNIFICANT CHANGE UP (ref 0–41)
B-OH-BUTYR SERPL-SCNC: 0.3 MMOL/L — SIGNIFICANT CHANGE UP
BACTERIA # UR AUTO: ABNORMAL /HPF
BILIRUB SERPL-MCNC: <0.2 MG/DL — SIGNIFICANT CHANGE UP (ref 0.2–1.2)
BUN SERPL-MCNC: <3 MG/DL — LOW (ref 10–20)
BUN SERPL-MCNC: <3 MG/DL — LOW (ref 10–20)
CALCIUM SERPL-MCNC: 7.4 MG/DL — LOW (ref 8.4–10.5)
CALCIUM SERPL-MCNC: 7.7 MG/DL — LOW (ref 8.4–10.5)
CHLORIDE SERPL-SCNC: 88 MMOL/L — LOW (ref 98–110)
CHLORIDE SERPL-SCNC: 94 MMOL/L — LOW (ref 98–110)
CK MB CFR SERPL CALC: 1.9 NG/ML — SIGNIFICANT CHANGE UP (ref 0.6–6.3)
CK SERPL-CCNC: 124 U/L — SIGNIFICANT CHANGE UP (ref 0–225)
CO2 SERPL-SCNC: 20 MMOL/L — SIGNIFICANT CHANGE UP (ref 17–32)
CO2 SERPL-SCNC: 21 MMOL/L — SIGNIFICANT CHANGE UP (ref 17–32)
CREAT SERPL-MCNC: <0.5 MG/DL — LOW (ref 0.7–1.5)
CREAT SERPL-MCNC: <0.5 MG/DL — LOW (ref 0.7–1.5)
EGFR: 136 ML/MIN/1.73M2 — SIGNIFICANT CHANGE UP
EGFR: 136 ML/MIN/1.73M2 — SIGNIFICANT CHANGE UP
EPI CELLS # UR: PRESENT
ESTIMATED AVERAGE GLUCOSE: 306 MG/DL — HIGH (ref 68–114)
GLUCOSE BLDC GLUCOMTR-MCNC: 120 MG/DL — HIGH (ref 70–99)
GLUCOSE BLDC GLUCOMTR-MCNC: 130 MG/DL — HIGH (ref 70–99)
GLUCOSE BLDC GLUCOMTR-MCNC: 140 MG/DL — HIGH (ref 70–99)
GLUCOSE BLDC GLUCOMTR-MCNC: 147 MG/DL — HIGH (ref 70–99)
GLUCOSE BLDC GLUCOMTR-MCNC: 153 MG/DL — HIGH (ref 70–99)
GLUCOSE BLDC GLUCOMTR-MCNC: 157 MG/DL — HIGH (ref 70–99)
GLUCOSE BLDC GLUCOMTR-MCNC: 180 MG/DL — HIGH (ref 70–99)
GLUCOSE BLDC GLUCOMTR-MCNC: 198 MG/DL — HIGH (ref 70–99)
GLUCOSE BLDC GLUCOMTR-MCNC: 224 MG/DL — HIGH (ref 70–99)
GLUCOSE BLDC GLUCOMTR-MCNC: 231 MG/DL — HIGH (ref 70–99)
GLUCOSE BLDC GLUCOMTR-MCNC: 239 MG/DL — HIGH (ref 70–99)
GLUCOSE BLDC GLUCOMTR-MCNC: 263 MG/DL — HIGH (ref 70–99)
GLUCOSE BLDC GLUCOMTR-MCNC: 271 MG/DL — HIGH (ref 70–99)
GLUCOSE BLDC GLUCOMTR-MCNC: 276 MG/DL — HIGH (ref 70–99)
GLUCOSE BLDC GLUCOMTR-MCNC: 295 MG/DL — HIGH (ref 70–99)
GLUCOSE BLDC GLUCOMTR-MCNC: 328 MG/DL — HIGH (ref 70–99)
GLUCOSE BLDC GLUCOMTR-MCNC: 335 MG/DL — HIGH (ref 70–99)
GLUCOSE BLDC GLUCOMTR-MCNC: 338 MG/DL — HIGH (ref 70–99)
GLUCOSE BLDC GLUCOMTR-MCNC: 356 MG/DL — HIGH (ref 70–99)
GLUCOSE BLDC GLUCOMTR-MCNC: 369 MG/DL — HIGH (ref 70–99)
GLUCOSE BLDC GLUCOMTR-MCNC: 375 MG/DL — HIGH (ref 70–99)
GLUCOSE BLDC GLUCOMTR-MCNC: 381 MG/DL — HIGH (ref 70–99)
GLUCOSE BLDC GLUCOMTR-MCNC: 427 MG/DL — HIGH (ref 70–99)
GLUCOSE SERPL-MCNC: 147 MG/DL — HIGH (ref 70–99)
GLUCOSE SERPL-MCNC: 287 MG/DL — HIGH (ref 70–99)
HCT VFR BLD CALC: 35 % — LOW (ref 37–47)
HGB BLD-MCNC: 11.8 G/DL — LOW (ref 12–16)
MAGNESIUM SERPL-MCNC: 1.9 MG/DL — SIGNIFICANT CHANGE UP (ref 1.8–2.4)
MAGNESIUM SERPL-MCNC: 2.1 MG/DL — SIGNIFICANT CHANGE UP (ref 1.8–2.4)
MCHC RBC-ENTMCNC: 32.2 PG — HIGH (ref 27–31)
MCHC RBC-ENTMCNC: 33.8 G/DL — SIGNIFICANT CHANGE UP (ref 32–37)
MCV RBC AUTO: 95.4 FL — SIGNIFICANT CHANGE UP (ref 81–99)
NRBC # BLD: 0 /100 WBCS — SIGNIFICANT CHANGE UP (ref 0–0)
PHOSPHATE SERPL-MCNC: 2.2 MG/DL — SIGNIFICANT CHANGE UP (ref 2.1–4.9)
PLATELET # BLD AUTO: 138 K/UL — SIGNIFICANT CHANGE UP (ref 130–400)
PMV BLD: 12.9 FL — HIGH (ref 7.4–10.4)
POTASSIUM SERPL-MCNC: 2.8 MMOL/L — LOW (ref 3.5–5)
POTASSIUM SERPL-MCNC: 3.6 MMOL/L — SIGNIFICANT CHANGE UP (ref 3.5–5)
POTASSIUM SERPL-SCNC: 2.8 MMOL/L — LOW (ref 3.5–5)
POTASSIUM SERPL-SCNC: 3.6 MMOL/L — SIGNIFICANT CHANGE UP (ref 3.5–5)
PROT SERPL-MCNC: 4.2 G/DL — LOW (ref 6–8)
RBC # BLD: 3.67 M/UL — LOW (ref 4.2–5.4)
RBC # FLD: 14 % — SIGNIFICANT CHANGE UP (ref 11.5–14.5)
RBC CASTS # UR COMP ASSIST: 3 /HPF — SIGNIFICANT CHANGE UP (ref 0–4)
SODIUM SERPL-SCNC: 123 MMOL/L — LOW (ref 135–146)
SODIUM SERPL-SCNC: 124 MMOL/L — LOW (ref 135–146)
SQUAMOUS # UR AUTO: SIGNIFICANT CHANGE UP /HPF (ref 0–5)
T3 SERPL-MCNC: 20 NG/DL — LOW (ref 80–200)
T4 AB SER-ACNC: 1.7 UG/DL — LOW (ref 4.6–12)
TRIGL SERPL-MCNC: >4425 MG/DL — HIGH
TROPONIN T, HIGH SENSITIVITY RESULT: <6 NG/L — SIGNIFICANT CHANGE UP (ref 6–13)
TROPONIN T, HIGH SENSITIVITY RESULT: <6 NG/L — SIGNIFICANT CHANGE UP (ref 6–13)
TSH SERPL-MCNC: 55.83 UIU/ML — HIGH (ref 0.27–4.2)
WBC # BLD: 5.56 K/UL — SIGNIFICANT CHANGE UP (ref 4.8–10.8)
WBC # FLD AUTO: 5.56 K/UL — SIGNIFICANT CHANGE UP (ref 4.8–10.8)
WBC UR QL: 4 /HPF — SIGNIFICANT CHANGE UP (ref 0–5)

## 2024-08-21 PROCEDURE — 82550 ASSAY OF CK (CPK): CPT

## 2024-08-21 PROCEDURE — 80048 BASIC METABOLIC PNL TOTAL CA: CPT

## 2024-08-21 PROCEDURE — 84443 ASSAY THYROID STIM HORMONE: CPT

## 2024-08-21 PROCEDURE — 83930 ASSAY OF BLOOD OSMOLALITY: CPT

## 2024-08-21 PROCEDURE — 85610 PROTHROMBIN TIME: CPT

## 2024-08-21 PROCEDURE — 83735 ASSAY OF MAGNESIUM: CPT

## 2024-08-21 PROCEDURE — 99222 1ST HOSP IP/OBS MODERATE 55: CPT

## 2024-08-21 PROCEDURE — 84100 ASSAY OF PHOSPHORUS: CPT

## 2024-08-21 PROCEDURE — 85730 THROMBOPLASTIN TIME PARTIAL: CPT

## 2024-08-21 PROCEDURE — 85027 COMPLETE CBC AUTOMATED: CPT

## 2024-08-21 PROCEDURE — 84480 ASSAY TRIIODOTHYRONINE (T3): CPT

## 2024-08-21 PROCEDURE — ZZZZZ: CPT

## 2024-08-21 PROCEDURE — 83036 HEMOGLOBIN GLYCOSYLATED A1C: CPT

## 2024-08-21 PROCEDURE — 82553 CREATINE MB FRACTION: CPT

## 2024-08-21 PROCEDURE — 84478 ASSAY OF TRIGLYCERIDES: CPT

## 2024-08-21 PROCEDURE — 84436 ASSAY OF TOTAL THYROXINE: CPT

## 2024-08-21 PROCEDURE — 93010 ELECTROCARDIOGRAM REPORT: CPT

## 2024-08-21 PROCEDURE — 36415 COLL VENOUS BLD VENIPUNCTURE: CPT

## 2024-08-21 PROCEDURE — 82010 KETONE BODYS QUAN: CPT

## 2024-08-21 PROCEDURE — 93005 ELECTROCARDIOGRAM TRACING: CPT

## 2024-08-21 PROCEDURE — 82962 GLUCOSE BLOOD TEST: CPT

## 2024-08-21 PROCEDURE — 84484 ASSAY OF TROPONIN QUANT: CPT

## 2024-08-21 PROCEDURE — 80053 COMPREHEN METABOLIC PANEL: CPT

## 2024-08-21 RX ORDER — INSULIN REGULAR, HUMAN 100/ML (3)
5 INSULIN PEN (ML) SUBCUTANEOUS
Qty: 100 | Refills: 0 | Status: DISCONTINUED | OUTPATIENT
Start: 2024-08-21 | End: 2024-08-22

## 2024-08-21 RX ORDER — DEXTROSE 15 G/33 G
50 GEL IN PACKET (GRAM) ORAL
Refills: 0 | Status: DISCONTINUED | OUTPATIENT
Start: 2024-08-21 | End: 2024-08-23

## 2024-08-21 RX ORDER — INSULIN REGULAR, HUMAN 100/ML (3)
4 INSULIN PEN (ML) SUBCUTANEOUS
Qty: 100 | Refills: 0 | Status: DISCONTINUED | OUTPATIENT
Start: 2024-08-21 | End: 2024-08-21

## 2024-08-21 RX ORDER — CHLORHEXIDINE GLUCONATE 40 MG/ML
1 SOLUTION TOPICAL
Refills: 0 | Status: DISCONTINUED | OUTPATIENT
Start: 2024-08-21 | End: 2024-08-23

## 2024-08-21 RX ORDER — IBUPROFEN 600 MG
600 TABLET ORAL EVERY 8 HOURS
Refills: 0 | Status: DISCONTINUED | OUTPATIENT
Start: 2024-08-21 | End: 2024-08-23

## 2024-08-21 RX ORDER — DEXTROSE 15 G/33 G
25 GEL IN PACKET (GRAM) ORAL
Refills: 0 | Status: DISCONTINUED | OUTPATIENT
Start: 2024-08-21 | End: 2024-08-23

## 2024-08-21 RX ORDER — IBUPROFEN 600 MG
600 TABLET ORAL EVERY 6 HOURS
Refills: 0 | Status: DISCONTINUED | OUTPATIENT
Start: 2024-08-21 | End: 2024-08-21

## 2024-08-21 RX ORDER — POTASSIUM CHLORIDE 10 MEQ
40 TABLET, EXT RELEASE, PARTICLES/CRYSTALS ORAL ONCE
Refills: 0 | Status: COMPLETED | OUTPATIENT
Start: 2024-08-21 | End: 2024-08-21

## 2024-08-21 RX ORDER — HEPARIN SODIUM,BOVINE 1000/ML
5000 VIAL (ML) INJECTION EVERY 8 HOURS
Refills: 0 | Status: DISCONTINUED | OUTPATIENT
Start: 2024-08-21 | End: 2024-08-22

## 2024-08-21 RX ORDER — LEVOTHYROXINE SODIUM 100 MCG
125 TABLET ORAL DAILY
Refills: 0 | Status: DISCONTINUED | OUTPATIENT
Start: 2024-08-21 | End: 2024-08-22

## 2024-08-21 RX ORDER — DEXTROSE 15 G/33 G
25 GEL IN PACKET (GRAM) ORAL ONCE
Refills: 0 | Status: DISCONTINUED | OUTPATIENT
Start: 2024-08-21 | End: 2024-08-23

## 2024-08-21 RX ORDER — CRANBERRY FRUIT EXTRACT 650 MG
4 CAPSULE ORAL DAILY
Refills: 0 | Status: DISCONTINUED | OUTPATIENT
Start: 2024-08-21 | End: 2024-08-22

## 2024-08-21 RX ORDER — GLUCAGON INJECTION, SOLUTION 1 MG/.2ML
1 INJECTION, SOLUTION SUBCUTANEOUS ONCE
Refills: 0 | Status: DISCONTINUED | OUTPATIENT
Start: 2024-08-21 | End: 2024-08-23

## 2024-08-21 RX ORDER — HYDROMORPHONE HYDROCHLORIDE 2 MG/1
1 TABLET ORAL EVERY 4 HOURS
Refills: 0 | Status: DISCONTINUED | OUTPATIENT
Start: 2024-08-21 | End: 2024-08-21

## 2024-08-21 RX ORDER — PANTOPRAZOLE SODIUM 40 MG
40 TABLET, DELAYED RELEASE (ENTERIC COATED) ORAL
Refills: 0 | Status: DISCONTINUED | OUTPATIENT
Start: 2024-08-21 | End: 2024-08-23

## 2024-08-21 RX ORDER — DEXTROSE 15 G/33 G
15 GEL IN PACKET (GRAM) ORAL ONCE
Refills: 0 | Status: DISCONTINUED | OUTPATIENT
Start: 2024-08-21 | End: 2024-08-23

## 2024-08-21 RX ORDER — ZOLPIDEM TARTRATE 5 MG/1
1 TABLET, FILM COATED ORAL
Refills: 0 | DISCHARGE

## 2024-08-21 RX ORDER — SODIUM CHLORIDE 9 MG/ML
1000 INJECTION INTRAMUSCULAR; INTRAVENOUS; SUBCUTANEOUS
Refills: 0 | Status: DISCONTINUED | OUTPATIENT
Start: 2024-08-21 | End: 2024-08-21

## 2024-08-21 RX ORDER — POTASSIUM CHLORIDE 10 MEQ
20 TABLET, EXT RELEASE, PARTICLES/CRYSTALS ORAL
Refills: 0 | Status: COMPLETED | OUTPATIENT
Start: 2024-08-21 | End: 2024-08-21

## 2024-08-21 RX ORDER — ONDANSETRON 2 MG/ML
4 INJECTION, SOLUTION INTRAMUSCULAR; INTRAVENOUS EVERY 6 HOURS
Refills: 0 | Status: DISCONTINUED | OUTPATIENT
Start: 2024-08-21 | End: 2024-08-22

## 2024-08-21 RX ORDER — ACETAMINOPHEN 325 MG/1
650 TABLET ORAL EVERY 6 HOURS
Refills: 0 | Status: DISCONTINUED | OUTPATIENT
Start: 2024-08-21 | End: 2024-08-22

## 2024-08-21 RX ADMIN — Medication 4 UNIT(S)/HR: at 08:45

## 2024-08-21 RX ADMIN — Medication 150 MILLILITER(S): at 12:15

## 2024-08-21 RX ADMIN — Medication 50 MILLIEQUIVALENT(S): at 17:51

## 2024-08-21 RX ADMIN — HYDROMORPHONE HYDROCHLORIDE 1 MILLIGRAM(S): 2 TABLET ORAL at 06:41

## 2024-08-21 RX ADMIN — Medication 5 UNIT(S)/HR: at 20:43

## 2024-08-21 RX ADMIN — SODIUM CHLORIDE 150 MILLILITER(S): 9 INJECTION INTRAMUSCULAR; INTRAVENOUS; SUBCUTANEOUS at 02:40

## 2024-08-21 RX ADMIN — Medication 4 GRAM(S): at 12:25

## 2024-08-21 RX ADMIN — Medication 40 MILLIGRAM(S): at 06:31

## 2024-08-21 RX ADMIN — Medication 40 MILLIEQUIVALENT(S): at 12:15

## 2024-08-21 RX ADMIN — Medication 5 UNIT(S)/HR: at 12:25

## 2024-08-21 RX ADMIN — Medication 5000 UNIT(S): at 06:31

## 2024-08-21 RX ADMIN — Medication 150 MILLILITER(S): at 05:20

## 2024-08-21 RX ADMIN — Medication 100 MILLILITER(S): at 12:50

## 2024-08-21 RX ADMIN — Medication 100 GRAM(S): at 14:13

## 2024-08-21 RX ADMIN — HYDROMORPHONE HYDROCHLORIDE 1 MILLIGRAM(S): 2 TABLET ORAL at 06:49

## 2024-08-21 RX ADMIN — Medication 50 MILLIEQUIVALENT(S): at 12:15

## 2024-08-21 RX ADMIN — Medication 50 MILLIEQUIVALENT(S): at 14:14

## 2024-08-21 RX ADMIN — Medication 600 MILLIGRAM(S): at 15:46

## 2024-08-21 RX ADMIN — ACETAMINOPHEN 650 MILLIGRAM(S): 325 TABLET ORAL at 09:46

## 2024-08-21 RX ADMIN — ACETAMINOPHEN 650 MILLIGRAM(S): 325 TABLET ORAL at 08:46

## 2024-08-21 RX ADMIN — Medication 5000 UNIT(S): at 21:11

## 2024-08-21 RX ADMIN — Medication 5000 UNIT(S): at 14:13

## 2024-08-21 RX ADMIN — Medication 600 MILLIGRAM(S): at 14:35

## 2024-08-21 RX ADMIN — Medication 4 UNIT(S)/HR: at 05:20

## 2024-08-21 RX ADMIN — Medication 9 UNIT(S)/HR: at 05:20

## 2024-08-21 RX ADMIN — ONDANSETRON 4 MILLIGRAM(S): 2 INJECTION, SOLUTION INTRAMUSCULAR; INTRAVENOUS at 08:46

## 2024-08-21 RX ADMIN — Medication 125 MICROGRAM(S): at 06:31

## 2024-08-21 NOTE — PATIENT PROFILE ADULT - NSPROSPHOSPCHAPLAINYN_GEN_A_NUR
-Possibly Urosepsis  -UA with small amount of blood and 2+ bacteria  -Given ceftriaxone in ED, continue for now   -Blood cultures and urine cultures pending  -Tachycardic, HR as high as 160s. -Lactic acid 3.7, increased to 3.8.  Awaiting repeat  -Administer another fluid bolus no

## 2024-08-21 NOTE — ED ADULT NURSE REASSESSMENT NOTE - NS ED NURSE REASSESS COMMENT FT1
Patient alert and oriented x 4. Patient c/o dizziness and headache. Requesting to walk to the bathroom. RN advised patient not to walk. Patient attempted to walk and became unsteady. RN helped patient back into bed. Patient walked to the bathroom.

## 2024-08-21 NOTE — H&P ADULT - NSHPLABSRESULTS_GEN_ALL_CORE
16.4   5.86  )-----------( 187      ( 20 Aug 2024 21:05 )             36.6       08-20    114<LL>  |  80<L>  |  <3<L>  ----------------------------<  493<HH>  3.6   |  17  |  <0.5<L>    Ca    5.4<LL>      20 Aug 2024 21:05  Mg     2.0     08-19    TPro  5.5<L>  /  Alb  3.0<L>  /  TBili  <0.2  /  DBili  0.6<H>  /  AST  5   /  ALT  5   /  AlkPhos  105  08-20              Urinalysis Basic - ( 20 Aug 2024 23:34 )    Color: Orange / Appearance: Clear / SG: >1.030 / pH: x  Gluc: x / Ketone: 15 mg/dL  / Bili: Negative / Urobili: 0.2 mg/dL   Blood: x / Protein: 30 mg/dL / Nitrite: Negative   Leuk Esterase: Negative / RBC: 3 /HPF / WBC 4 /HPF   Sq Epi: x / Non Sq Epi: 2-3 /HPF / Bacteria: Few /HPF            Lactate Trend  08-20 @ 21:05 Lactate:1.8             CAPILLARY BLOOD GLUCOSE      POCT Blood Glucose.: 427 mg/dL (21 Aug 2024 01:01)

## 2024-08-21 NOTE — H&P ADULT - HISTORY OF PRESENT ILLNESS
58-year-old femaleWith past medical history as noted below patient comes to the hospital complaining of abdominal pain patient found to have severe hypertriglyceridemia (>6000) . patient denies alcohol abuse.  Pt claims to be compliant to meds. Patient started on IV insulin in ER.   Patient pending admission to ICU when bed available

## 2024-08-21 NOTE — H&P ADULT - PROBLEM SELECTOR PLAN 1
to ICU  started IV insulin 9u/h  IVF hydration NS 150cc/h  start omega 3 fatty acids  fenofibrate 200mg qd  pt cannot take/& refuses  statins due to prior hx muscle pains  f/u lipid panels to ICU  started IV insulin 9u/h  IVF hydration NS 150cc/h  start omega 3 fatty acids  fenofibrate 200mg qd  pt cannot take/& refuses  statins due to prior hx muscle pains  f/u lipid panels  pt's corrected Na for hyperglycemia & hypertriglyceridemia is 133

## 2024-08-21 NOTE — ED PROVIDER NOTE - OBJECTIVE STATEMENT
58 year old female past medical history of Diabetes, Elevated Triglycerides patient comes to emergency room for 3 days of  headache, intermittent chest pain and burning epigastric abdominal pain. Patient seen in ED yesterday diagnosed with severe hypertriglyceridemia in the 6000's no pancreatitis troponin negative plan was for admission and gemfibrozil 600 with IV fluids.  Patient left AMA to get care for her dog at home.  Patient returns now admits to continued symptoms no new symptoms.  Patient tells us that she has been taking gemfibrozil 600 daily.

## 2024-08-21 NOTE — ED PROVIDER NOTE - CLINICAL SUMMARY MEDICAL DECISION MAKING FREE TEXT BOX
58yF DM, hyperTG,  pw 3 days of  headache  intermittent Chest pain and burning epigastric abdominal pain. Patient seen in ED yesterday diagnosed with severe hypertriglyceridemia in the 6000's no pancreatitis troponin negative plan was for admission and gemfibrozil 600 with IV fluids.  Patient left AGAINST MEDICAL ADVICE to get care for her dog at home.  Patient returns now admits to continued symptoms no new symptoms.  Patient tells us that she has been taking gemfibrozil 600 daily.  Patient nontoxic-appearing labs repeated triglycerides greater than 6000 lipase normal abdomen soft nontender no indication for repeat CT at this time.  EKG troponin remains less than 6.  Given patient is already been on gemfibrozil discussed with intensivist plan for ICU admission for insulin drip and heparin.  Patient's fingerstick is greater than 400.  Will hold dextrose drip at this time.  Patient admitted in well-appearing condition to ICU

## 2024-08-21 NOTE — H&P ADULT - NSHPPHYSICALEXAM_GEN_ALL_CORE
GENERAL:  57y/o obese disheveled  W  Female   pt c/o abdominal pains  HEAD:  Atraumatic, Normocephalic  EYES: EOMI, PERRLA, conjunctiva and sclera clear  NECK: Supple, No JVD, no cervical lymphadenopathy, non-tender  CHEST/LUNG: Clear to auscultation bilaterally; No wheeze, rhonchi, or rales  HEART: Regular rate and rhythm; S1&S2  ABDOMEN: Soft, lower abd. tenderness   Nondistended x 4 quadrants; Bowel sounds present  EXTREMITIES:   Peripheral Pulses Present, No clubbing, no cyanosis, or no edema, no calf tenderness  PSYCH: AAOx3, cooperative, appropriate pt w/ scattered speech   NEUROLOGY: WNL  SKIN: WNL

## 2024-08-21 NOTE — ED PROVIDER NOTE - CONSIDERATION OF ADMISSION OBSERVATION
[Appropriate medications for patient's presenting complaints were ordered and effects were reassessed].   [Patient's external records were reviewed].     Escalation to [admission/observation] was considered.  At this time, patient requires inpatient hospitalization [- monitored setting]. Consideration of Admission/Observation

## 2024-08-21 NOTE — PATIENT PROFILE ADULT - FALL HARM RISK - HARM RISK INTERVENTIONS

## 2024-08-21 NOTE — H&P ADULT - NS ATTEND AMEND GEN_ALL_CORE FT
to ICU  started IV insulin 9u/h  IVF hydration NS 150cc/h  start omega 3 fatty acids  fenofibrate 200mg qd  pt cannot take/& refuses  statins due to prior hx muscle pains  f/u lipid panels  pt's corrected Na for hyperglycemia & hypertriglyceridemia is 133

## 2024-08-21 NOTE — H&P ADULT - NSICDXPASTMEDICALHX_GEN_ALL_CORE_FT
PAST MEDICAL HISTORY:  Gallbladder calculus with acute cholecystitis     History of diverticulitis     Hypothyroid     Obese     Pancreatitis     Panic disorder     Seizures     Type 2 diabetes mellitus

## 2024-08-22 LAB
ALBUMIN SERPL ELPH-MCNC: 2.9 G/DL — LOW (ref 3.5–5.2)
ALP SERPL-CCNC: 88 U/L — SIGNIFICANT CHANGE UP (ref 30–115)
ALT FLD-CCNC: 5 U/L — SIGNIFICANT CHANGE UP (ref 0–41)
ANION GAP SERPL CALC-SCNC: 12 MMOL/L — SIGNIFICANT CHANGE UP (ref 7–14)
ANION GAP SERPL CALC-SCNC: 9 MMOL/L — SIGNIFICANT CHANGE UP (ref 7–14)
AST SERPL-CCNC: 5 U/L — SIGNIFICANT CHANGE UP (ref 0–41)
BILIRUB SERPL-MCNC: <0.2 MG/DL — SIGNIFICANT CHANGE UP (ref 0.2–1.2)
BUN SERPL-MCNC: 6 MG/DL — LOW (ref 10–20)
BUN SERPL-MCNC: <3 MG/DL — LOW (ref 10–20)
CALCIUM SERPL-MCNC: 8.1 MG/DL — LOW (ref 8.4–10.5)
CALCIUM SERPL-MCNC: 8.4 MG/DL — SIGNIFICANT CHANGE UP (ref 8.4–10.5)
CHLORIDE SERPL-SCNC: 97 MMOL/L — LOW (ref 98–110)
CHLORIDE SERPL-SCNC: 98 MMOL/L — SIGNIFICANT CHANGE UP (ref 98–110)
CO2 SERPL-SCNC: 20 MMOL/L — SIGNIFICANT CHANGE UP (ref 17–32)
CO2 SERPL-SCNC: 21 MMOL/L — SIGNIFICANT CHANGE UP (ref 17–32)
CREAT SERPL-MCNC: <0.5 MG/DL — LOW (ref 0.7–1.5)
CREAT SERPL-MCNC: <0.5 MG/DL — LOW (ref 0.7–1.5)
EGFR: 123 ML/MIN/1.73M2 — SIGNIFICANT CHANGE UP
EGFR: 136 ML/MIN/1.73M2 — SIGNIFICANT CHANGE UP
GLUCOSE BLDC GLUCOMTR-MCNC: 122 MG/DL — HIGH (ref 70–99)
GLUCOSE BLDC GLUCOMTR-MCNC: 140 MG/DL — HIGH (ref 70–99)
GLUCOSE BLDC GLUCOMTR-MCNC: 159 MG/DL — HIGH (ref 70–99)
GLUCOSE BLDC GLUCOMTR-MCNC: 185 MG/DL — HIGH (ref 70–99)
GLUCOSE BLDC GLUCOMTR-MCNC: 185 MG/DL — HIGH (ref 70–99)
GLUCOSE BLDC GLUCOMTR-MCNC: 190 MG/DL — HIGH (ref 70–99)
GLUCOSE BLDC GLUCOMTR-MCNC: 190 MG/DL — HIGH (ref 70–99)
GLUCOSE BLDC GLUCOMTR-MCNC: 193 MG/DL — HIGH (ref 70–99)
GLUCOSE BLDC GLUCOMTR-MCNC: 196 MG/DL — HIGH (ref 70–99)
GLUCOSE BLDC GLUCOMTR-MCNC: 203 MG/DL — HIGH (ref 70–99)
GLUCOSE SERPL-MCNC: 180 MG/DL — HIGH (ref 70–99)
GLUCOSE SERPL-MCNC: 188 MG/DL — HIGH (ref 70–99)
HCT VFR BLD CALC: 34 % — LOW (ref 37–47)
HGB BLD-MCNC: 12.1 G/DL — SIGNIFICANT CHANGE UP (ref 12–16)
MAGNESIUM SERPL-MCNC: 1.8 MG/DL — SIGNIFICANT CHANGE UP (ref 1.8–2.4)
MCHC RBC-ENTMCNC: 33.8 PG — HIGH (ref 27–31)
MCHC RBC-ENTMCNC: 35.6 G/DL — SIGNIFICANT CHANGE UP (ref 32–37)
MCV RBC AUTO: 95 FL — SIGNIFICANT CHANGE UP (ref 81–99)
NRBC # BLD: 0 /100 WBCS — SIGNIFICANT CHANGE UP (ref 0–0)
PLATELET # BLD AUTO: 151 K/UL — SIGNIFICANT CHANGE UP (ref 130–400)
PMV BLD: 12.4 FL — HIGH (ref 7.4–10.4)
POTASSIUM SERPL-MCNC: 3.1 MMOL/L — LOW (ref 3.5–5)
POTASSIUM SERPL-MCNC: 4 MMOL/L — SIGNIFICANT CHANGE UP (ref 3.5–5)
POTASSIUM SERPL-SCNC: 3.1 MMOL/L — LOW (ref 3.5–5)
POTASSIUM SERPL-SCNC: 4 MMOL/L — SIGNIFICANT CHANGE UP (ref 3.5–5)
PROT SERPL-MCNC: 3.2 G/DL — LOW (ref 6–8)
RBC # BLD: 3.58 M/UL — LOW (ref 4.2–5.4)
RBC # FLD: 14.2 % — SIGNIFICANT CHANGE UP (ref 11.5–14.5)
SODIUM SERPL-SCNC: 128 MMOL/L — LOW (ref 135–146)
SODIUM SERPL-SCNC: 129 MMOL/L — LOW (ref 135–146)
TRIGL SERPL-MCNC: 6090 MG/DL — SIGNIFICANT CHANGE UP
TRIGL SERPL-MCNC: 6591 MG/DL — SIGNIFICANT CHANGE UP
WBC # BLD: 5.35 K/UL — SIGNIFICANT CHANGE UP (ref 4.8–10.8)
WBC # FLD AUTO: 5.35 K/UL — SIGNIFICANT CHANGE UP (ref 4.8–10.8)

## 2024-08-22 PROCEDURE — 99253 IP/OBS CNSLTJ NEW/EST LOW 45: CPT

## 2024-08-22 PROCEDURE — 99232 SBSQ HOSP IP/OBS MODERATE 35: CPT

## 2024-08-22 RX ORDER — ENOXAPARIN SODIUM 100 MG/ML
40 INJECTION SUBCUTANEOUS EVERY 24 HOURS
Refills: 0 | Status: DISCONTINUED | OUTPATIENT
Start: 2024-08-22 | End: 2024-08-23

## 2024-08-22 RX ORDER — INSULIN GLARGINE 100 [IU]/ML
30 INJECTION, SOLUTION SUBCUTANEOUS
Refills: 0 | Status: DISCONTINUED | OUTPATIENT
Start: 2024-08-22 | End: 2024-08-23

## 2024-08-22 RX ORDER — POTASSIUM CHLORIDE 10 MEQ
40 TABLET, EXT RELEASE, PARTICLES/CRYSTALS ORAL ONCE
Refills: 0 | Status: COMPLETED | OUTPATIENT
Start: 2024-08-22 | End: 2024-08-22

## 2024-08-22 RX ORDER — SODIUM CHLORIDE 9 MG/ML
1000 INJECTION INTRAMUSCULAR; INTRAVENOUS; SUBCUTANEOUS
Refills: 0 | Status: DISCONTINUED | OUTPATIENT
Start: 2024-08-22 | End: 2024-08-22

## 2024-08-22 RX ORDER — KETOROLAC TROMETHAMINE 30 MG/ML
30 INJECTION, SOLUTION INTRAMUSCULAR ONCE
Refills: 0 | Status: DISCONTINUED | OUTPATIENT
Start: 2024-08-22 | End: 2024-08-22

## 2024-08-22 RX ORDER — LEVOTHYROXINE SODIUM 100 MCG
75 TABLET ORAL ONCE
Refills: 0 | Status: COMPLETED | OUTPATIENT
Start: 2024-08-22 | End: 2024-08-22

## 2024-08-22 RX ORDER — ONDANSETRON 2 MG/ML
4 INJECTION, SOLUTION INTRAMUSCULAR; INTRAVENOUS EVERY 6 HOURS
Refills: 0 | Status: DISCONTINUED | OUTPATIENT
Start: 2024-08-22 | End: 2024-08-23

## 2024-08-22 RX ORDER — LEVOTHYROXINE SODIUM 100 MCG
200 TABLET ORAL DAILY
Refills: 0 | Status: DISCONTINUED | OUTPATIENT
Start: 2024-08-23 | End: 2024-08-23

## 2024-08-22 RX ORDER — ACETAMINOPHEN 325 MG/1
1000 TABLET ORAL ONCE
Refills: 0 | Status: COMPLETED | OUTPATIENT
Start: 2024-08-22 | End: 2024-08-22

## 2024-08-22 RX ORDER — METFORMIN HYDROCHLORIDE 850 MG/1
850 TABLET, FILM COATED ORAL
Refills: 0 | Status: DISCONTINUED | OUTPATIENT
Start: 2024-08-22 | End: 2024-08-23

## 2024-08-22 RX ORDER — FENOFIBRATE 145 MG/1
145 TABLET, FILM COATED ORAL AT BEDTIME
Refills: 0 | Status: DISCONTINUED | OUTPATIENT
Start: 2024-08-22 | End: 2024-08-23

## 2024-08-22 RX ORDER — POTASSIUM CHLORIDE 10 MEQ
20 TABLET, EXT RELEASE, PARTICLES/CRYSTALS ORAL
Refills: 0 | Status: DISCONTINUED | OUTPATIENT
Start: 2024-08-22 | End: 2024-08-22

## 2024-08-22 RX ORDER — DIAZEPAM 10 MG
2 TABLET ORAL ONCE
Refills: 0 | Status: DISCONTINUED | OUTPATIENT
Start: 2024-08-22 | End: 2024-08-22

## 2024-08-22 RX ORDER — CRANBERRY FRUIT EXTRACT 650 MG
2 CAPSULE ORAL
Refills: 0 | Status: DISCONTINUED | OUTPATIENT
Start: 2024-08-22 | End: 2024-08-23

## 2024-08-22 RX ADMIN — METFORMIN HYDROCHLORIDE 850 MILLIGRAM(S): 850 TABLET, FILM COATED ORAL at 11:04

## 2024-08-22 RX ADMIN — Medication 5 UNIT(S)/HR: at 08:18

## 2024-08-22 RX ADMIN — ACETAMINOPHEN 1000 MILLIGRAM(S): 325 TABLET ORAL at 17:27

## 2024-08-22 RX ADMIN — FENOFIBRATE 145 MILLIGRAM(S): 145 TABLET, FILM COATED ORAL at 13:51

## 2024-08-22 RX ADMIN — Medication 100 MILLILITER(S): at 08:18

## 2024-08-22 RX ADMIN — Medication 40 MILLIGRAM(S): at 05:17

## 2024-08-22 RX ADMIN — Medication 5000 UNIT(S): at 05:20

## 2024-08-22 RX ADMIN — Medication 40 MILLIEQUIVALENT(S): at 11:39

## 2024-08-22 RX ADMIN — KETOROLAC TROMETHAMINE 30 MILLIGRAM(S): 30 INJECTION, SOLUTION INTRAMUSCULAR at 20:34

## 2024-08-22 RX ADMIN — Medication 75 MICROGRAM(S): at 13:51

## 2024-08-22 RX ADMIN — INSULIN GLARGINE 30 UNIT(S): 100 INJECTION, SOLUTION SUBCUTANEOUS at 22:13

## 2024-08-22 RX ADMIN — ENOXAPARIN SODIUM 40 MILLIGRAM(S): 100 INJECTION SUBCUTANEOUS at 18:06

## 2024-08-22 RX ADMIN — Medication 4 GRAM(S): at 11:09

## 2024-08-22 RX ADMIN — Medication 1: at 16:34

## 2024-08-22 RX ADMIN — Medication 2: at 22:14

## 2024-08-22 RX ADMIN — Medication 40 MILLIGRAM(S): at 13:52

## 2024-08-22 RX ADMIN — ACETAMINOPHEN 400 MILLIGRAM(S): 325 TABLET ORAL at 17:00

## 2024-08-22 RX ADMIN — SODIUM CHLORIDE 100 MILLILITER(S): 9 INJECTION INTRAMUSCULAR; INTRAVENOUS; SUBCUTANEOUS at 17:06

## 2024-08-22 RX ADMIN — KETOROLAC TROMETHAMINE 30 MILLIGRAM(S): 30 INJECTION, SOLUTION INTRAMUSCULAR at 21:24

## 2024-08-22 RX ADMIN — Medication 125 MICROGRAM(S): at 05:17

## 2024-08-22 RX ADMIN — ONDANSETRON 4 MILLIGRAM(S): 2 INJECTION, SOLUTION INTRAMUSCULAR; INTRAVENOUS at 22:32

## 2024-08-22 RX ADMIN — INSULIN GLARGINE 30 UNIT(S): 100 INJECTION, SOLUTION SUBCUTANEOUS at 13:52

## 2024-08-22 RX ADMIN — CHLORHEXIDINE GLUCONATE 1 APPLICATION(S): 40 SOLUTION TOPICAL at 05:20

## 2024-08-22 RX ADMIN — Medication 2 GRAM(S): at 17:05

## 2024-08-22 RX ADMIN — ONDANSETRON 4 MILLIGRAM(S): 2 INJECTION, SOLUTION INTRAMUSCULAR; INTRAVENOUS at 16:32

## 2024-08-22 RX ADMIN — Medication 600 MILLIGRAM(S): at 11:04

## 2024-08-22 RX ADMIN — Medication 600 MILLIGRAM(S): at 11:49

## 2024-08-22 RX ADMIN — Medication 2 MILLIGRAM(S): at 23:23

## 2024-08-22 RX ADMIN — Medication 5 UNIT(S)/HR: at 11:04

## 2024-08-22 RX ADMIN — Medication 5000 UNIT(S): at 13:51

## 2024-08-22 RX ADMIN — METFORMIN HYDROCHLORIDE 850 MILLIGRAM(S): 850 TABLET, FILM COATED ORAL at 17:05

## 2024-08-22 NOTE — CONSULT NOTE ADULT - SUBJECTIVE AND OBJECTIVE BOX
Patient is a 58y old  Female who presents with a chief complaint of severe hypertriglyceridemia (21 Aug 2024 00:49)      HPI:  58-year-old femaleWith past medical history as noted below patient comes to the hospital complaining of abdominal pain patient found to have severe hypertriglyceridemia (>6000) . patient denies alcohol abuse.  Pt claims to be compliant to meds. Patient started on IV insulin in ER.   Patient pending admission to ICU when bed available   (21 Aug 2024 00:49)      PAST MEDICAL & SURGICAL HISTORY:  Gallbladder calculus with acute cholecystitis      Pancreatitis      Seizures      Hypothyroid      Panic disorder      History of diverticulitis      Type 2 diabetes mellitus      Obese      S/P cholecystectomy        Allergies    penicillins (Unknown)  influenza virus vaccine, inactivated (Stomach Upset)  shellfish (Swelling)  Keflex (Swelling; Rash)  IV Contrast (Unknown)    Intolerances      FAMILY HISTORY:    Home Medications:  Ambien 10 mg oral tablet: 1 tab(s) orally once a day (at bedtime) (21 Aug 2024 00:44)  gemfibrozil 600 mg oral tablet: 1 tab(s) orally 2 times a day (21 Aug 2024 00:45)  Levothroid 125 mcg (0.125 mg) oral tablet: 1 tab(s) orally (21 Aug 2024 00:43)  omega-3 polyunsaturated fatty acids:  (21 Aug 2024 00:49)  Synthroid 125 mcg (0.125 mg) oral tablet: 1 tab(s) orally once a day (21 Aug 2024 00:41)    Occupation:  Alochol: Denied  Smoking: Denied  Drug Use: Denied  Marital Status:         ROS: as in HPI; All other systems reviewed are negative    ICU Vital Signs Last 24 Hrs  T(C): 36.2 (22 Aug 2024 07:00), Max: 36.7 (21 Aug 2024 19:01)  T(F): 97.1 (22 Aug 2024 07:00), Max: 98 (21 Aug 2024 19:01)  HR: 65 (22 Aug 2024 04:00) (64 - 97)  BP: 105/73 (22 Aug 2024 04:00) (105/73 - 152/90)  BP(mean): 85 (22 Aug 2024 04:00) (78 - 104)  ABP: --  ABP(mean): --  RR: 18 (22 Aug 2024 07:00) (14 - 18)  SpO2: 94% (22 Aug 2024 04:00) (90% - 100%)    O2 Parameters below as of 21 Aug 2024 21:00  Patient On (Oxygen Delivery Method): room air              Physical Examination:    General: No acute distress.  Alert, oriented, interactive, nonfocal    HEENT: Pupils equal, reactive to light.  Symmetric.    PULM: Clear to auscultation bilaterally, no significant sputum production    CVS: Regular rate and rhythm, no murmurs, rubs, or gallops    ABD: Soft, nondistended, nontender, normoactive bowel sounds, no masses    EXT: No edema, nontender    SKIN: Warm and well perfused, no rashes noted.              I&O's Detail    21 Aug 2024 07:01  -  22 Aug 2024 07:00  --------------------------------------------------------  IN:    Insulin: 24 mL    Insulin: 82 mL  Total IN: 106 mL    OUT:  Total OUT: 0 mL    Total NET: 106 mL            LABS:                        11.8   5.56  )-----------( 138      ( 21 Aug 2024 07:26 )             35.0     22 Aug 2024 06:34    128    |  98     |  6      ----------------------------<  188    3.1     |  21     |  <0.5     Ca    8.1        22 Aug 2024 06:34  Phos  2.2       21 Aug 2024 07:26  Mg     1.8       22 Aug 2024 06:34    TPro  3.2    /  Alb  2.9    /  TBili  <0.2   /  DBili  x      /  AST  5      /  ALT  5      /  AlkPhos  88     22 Aug 2024 06:34  Amylase x     lipase x          CARDIAC MARKERS ( 21 Aug 2024 02:16 )  x     / x     / x     / x     / 1.9 ng/mL      CAPILLARY BLOOD GLUCOSE      POCT Blood Glucose.: 185 mg/dL (22 Aug 2024 06:49)      Urinalysis Basic - ( 22 Aug 2024 06:34 )    Color: x / Appearance: x / SG: x / pH: x  Gluc: 188 mg/dL / Ketone: x  / Bili: x / Urobili: x   Blood: x / Protein: x / Nitrite: x   Leuk Esterase: x / RBC: x / WBC x   Sq Epi: x / Non Sq Epi: x / Bacteria: x      Culture    Lactate, Blood: 1.8 mmol/L (08-20-24 @ 21:05)      MEDICATIONS  (STANDING):  chlorhexidine 2% Cloths 1 Application(s) Topical <User Schedule>  dextrose 5% + sodium chloride 0.9%. 1000 milliLiter(s) (100 mL/Hr) IV Continuous <Continuous>  dextrose 5%. 1000 milliLiter(s) (50 mL/Hr) IV Continuous <Continuous>  dextrose 50% Injectable 25 milliLiter(s) IV Push every 15 minutes  dextrose 50% Injectable 25 Gram(s) IV Push once  dextrose 50% Injectable 50 milliLiter(s) IV Push every 15 minutes  glucagon  Injectable 1 milliGRAM(s) IntraMuscular once  heparin   Injectable 5000 Unit(s) SubCutaneous every 8 hours  insulin regular Infusion 5 Unit(s)/Hr (5 mL/Hr) IV Continuous <Continuous>  levothyroxine 125 MICROGram(s) Oral daily  omega-3-Acid Ethyl Esters 4 Gram(s) Oral daily  pantoprazole    Tablet 40 milliGRAM(s) Oral before breakfast    MEDICATIONS  (PRN):  acetaminophen     Tablet .. 650 milliGRAM(s) Oral every 6 hours PRN Mild Pain (1 - 3)  dextrose Oral Gel 15 Gram(s) Oral once PRN Blood Glucose LESS THAN 70 milliGRAM(s)/deciliter  ibuprofen  Tablet. 600 milliGRAM(s) Oral every 8 hours PRN Mild Pain (1 - 3)  ondansetron Injectable 4 milliGRAM(s) IV Push every 6 hours PRN Nausea and/or Vomiting           Patient is a 58y old  Female who presents with a chief complaint of severe hypertriglyceridemia (21 Aug 2024 00:49)      HPI:  58-year-old femaleWith past medical history as noted below patient comes to the hospital complaining of abdominal pain patient found to have severe hypertriglyceridemia (>6000) . patient denies alcohol abuse.  Pt claims to be compliant to meds. Patient started on IV insulin in ER.   Patient pending admission to ICU when bed available   (21 Aug 2024 00:49)  started on insulin drip     PAST MEDICAL & SURGICAL HISTORY:  Gallbladder calculus with acute cholecystitis      Pancreatitis      Seizures      Hypothyroid      Panic disorder      History of diverticulitis      Type 2 diabetes mellitus      Obese      S/P cholecystectomy        Allergies    penicillins (Unknown)  influenza virus vaccine, inactivated (Stomach Upset)  shellfish (Swelling)  Keflex (Swelling; Rash)  IV Contrast (Unknown)    Intolerances      FAMILY HISTORY:    Home Medications:  Ambien 10 mg oral tablet: 1 tab(s) orally once a day (at bedtime) (21 Aug 2024 00:44)  gemfibrozil 600 mg oral tablet: 1 tab(s) orally 2 times a day (21 Aug 2024 00:45)  Levothroid 125 mcg (0.125 mg) oral tablet: 1 tab(s) orally (21 Aug 2024 00:43)  omega-3 polyunsaturated fatty acids:  (21 Aug 2024 00:49)  Synthroid 125 mcg (0.125 mg) oral tablet: 1 tab(s) orally once a day (21 Aug 2024 00:41)    Occupation:  Alochol: Denied  Smoking: Denied  Drug Use: Denied  Marital Status:         ROS: as in HPI; All other systems reviewed are negative    ICU Vital Signs Last 24 Hrs  T(C): 36.2 (22 Aug 2024 07:00), Max: 36.7 (21 Aug 2024 19:01)  T(F): 97.1 (22 Aug 2024 07:00), Max: 98 (21 Aug 2024 19:01)  HR: 65 (22 Aug 2024 04:00) (64 - 97)  BP: 105/73 (22 Aug 2024 04:00) (105/73 - 152/90)  BP(mean): 85 (22 Aug 2024 04:00) (78 - 104)  ABP: --  ABP(mean): --  RR: 18 (22 Aug 2024 07:00) (14 - 18)  SpO2: 94% (22 Aug 2024 04:00) (90% - 100%)    O2 Parameters below as of 21 Aug 2024 21:00  Patient On (Oxygen Delivery Method): room air              Physical Examination:    General: No acute distress.  Alert, oriented, interactive, nonfocal    HEENT: Pupils equal, reactive to light.  Symmetric.    PULM: Clear to auscultation bilaterally, no significant sputum production    CVS: Regular rate and rhythm, no murmurs, rubs, or gallops    ABD: Soft, nondistended, nontender, normoactive bowel sounds, no masses    EXT: No edema, nontender    SKIN: Warm and well perfused, no rashes noted.              I&O's Detail    21 Aug 2024 07:01  -  22 Aug 2024 07:00  --------------------------------------------------------  IN:    Insulin: 24 mL    Insulin: 82 mL  Total IN: 106 mL    OUT:  Total OUT: 0 mL    Total NET: 106 mL            LABS:                        11.8   5.56  )-----------( 138      ( 21 Aug 2024 07:26 )             35.0     22 Aug 2024 06:34    128    |  98     |  6      ----------------------------<  188    3.1     |  21     |  <0.5     Ca    8.1        22 Aug 2024 06:34  Phos  2.2       21 Aug 2024 07:26  Mg     1.8       22 Aug 2024 06:34    TPro  3.2    /  Alb  2.9    /  TBili  <0.2   /  DBili  x      /  AST  5      /  ALT  5      /  AlkPhos  88     22 Aug 2024 06:34  Amylase x     lipase x          CARDIAC MARKERS ( 21 Aug 2024 02:16 )  x     / x     / x     / x     / 1.9 ng/mL      CAPILLARY BLOOD GLUCOSE      POCT Blood Glucose.: 185 mg/dL (22 Aug 2024 06:49)      Urinalysis Basic - ( 22 Aug 2024 06:34 )    Color: x / Appearance: x / SG: x / pH: x  Gluc: 188 mg/dL / Ketone: x  / Bili: x / Urobili: x   Blood: x / Protein: x / Nitrite: x   Leuk Esterase: x / RBC: x / WBC x   Sq Epi: x / Non Sq Epi: x / Bacteria: x      Culture    Lactate, Blood: 1.8 mmol/L (08-20-24 @ 21:05)      MEDICATIONS  (STANDING):  chlorhexidine 2% Cloths 1 Application(s) Topical <User Schedule>  dextrose 5% + sodium chloride 0.9%. 1000 milliLiter(s) (100 mL/Hr) IV Continuous <Continuous>  dextrose 5%. 1000 milliLiter(s) (50 mL/Hr) IV Continuous <Continuous>  dextrose 50% Injectable 25 milliLiter(s) IV Push every 15 minutes  dextrose 50% Injectable 25 Gram(s) IV Push once  dextrose 50% Injectable 50 milliLiter(s) IV Push every 15 minutes  glucagon  Injectable 1 milliGRAM(s) IntraMuscular once  heparin   Injectable 5000 Unit(s) SubCutaneous every 8 hours  insulin regular Infusion 5 Unit(s)/Hr (5 mL/Hr) IV Continuous <Continuous>  levothyroxine 125 MICROGram(s) Oral daily  omega-3-Acid Ethyl Esters 4 Gram(s) Oral daily  pantoprazole    Tablet 40 milliGRAM(s) Oral before breakfast    MEDICATIONS  (PRN):  acetaminophen     Tablet .. 650 milliGRAM(s) Oral every 6 hours PRN Mild Pain (1 - 3)  dextrose Oral Gel 15 Gram(s) Oral once PRN Blood Glucose LESS THAN 70 milliGRAM(s)/deciliter  ibuprofen  Tablet. 600 milliGRAM(s) Oral every 8 hours PRN Mild Pain (1 - 3)  ondansetron Injectable 4 milliGRAM(s) IV Push every 6 hours PRN Nausea and/or Vomiting

## 2024-08-22 NOTE — CONSULT NOTE ADULT - ATTENDING COMMENTS
patient seen and examined agree above note   case discussed with endo   start levothyroxin as per endo   metformin   continue insulin drip follow FS closely   IV hydration   replace K check mg , ca, ip , corrected Na level now around 140   BMP Q6 hrs   follow TG   as per endo no need for plasmapheresis  follow endo

## 2024-08-22 NOTE — CONSULT NOTE ADULT - ASSESSMENT
IMPRESSION:  Pt is a 58 Y o female with PMH of diverticulitis Hypothyroid, obese, pancreatitis, panic disorder, seizure, DM now admitted  for     #isolated hypertriglyceridemia 6600s ? familial??  #hyponatremia   #Hypokalemia   #HAGMA   #Severe Hypothyroidism TSH 55  #Uncontrolled DM  Hgb A1c of 12        PLAN:    CNS: At baseline, mentating well    HEENT: Oral care    PULMONARY:  HOB @ 45 degrees.  Aspiration precautions O2 as needed, ARIAN work up as OP     CARDIOVASCULAR: : Keep Map> 65, HR controlled     GI: Insulin drip, CT abdomen noted, no pancreatitis, speak with endo concerning need for plasmapheresis, the patient is asymptomatic, trend triglycerides, NPO for now, she may have familial Hypertriglyceridemia  Will need statin (high intensity), fibrate     RENAL:  Follow up lytes.  Correct as needed, hydration    INFECTIOUS DISEASE: Follow up cultures,     HEMATOLOGICAL:  DVT prophylaxis.    ENDOCRINE:  Hgb a1c with HAGMA, and triglycerides, consider euglycemic dka, obtain b hydroxy  for the TSH, endo consult, may need synthroid drip     MUSCULOSKELETAL: OOB to chair         
severe obesity, try 1000 kcal diet, no fruit juices, for uncontrolled type 2 diabetes, metformin 850 twice daily, and pioglitazone 30 mg. daily, at least on discharge if pioglitazone is not on formulary, for hyperlipidemia, fish oil capsules 2 g. twice daily here, and generic vascepa 2 g twice daily at discharge, fenofibrate 145 mg. daily, first dose stat, atorvastatin 40 mg. daily first dose stat and daily after that, patient must stop gemfibrozil at home, patient has been taking and filling l-thyroxine 125 mcg daily at home, so change l-thyroxine to 200 mcg. daily. stop intravenous insulin, change to glargine 30 units twice daily, lispro 15 units twice daily, at discharge toujeo max solostar as basal insulin, needs meter test strips, and lancets,, needs endocrine follow up 2-3 weeks.

## 2024-08-22 NOTE — CONSULT NOTE ADULT - SUBJECTIVE AND OBJECTIVE BOX
Reason for Endocrinology Consult: Diabetes    HPI: 58y Female      PAST MEDICAL & SURGICAL HISTORY:  Gallbladder calculus with acute cholecystitis      Pancreatitis      Seizures      Hypothyroid      Panic disorder      History of diverticulitis      Type 2 diabetes mellitus      Obese      S/P cholecystectomy        FAMILY HISTORY:      SH:  Smoking  Etoh:  Recreational Drugs:    Home Medications:  Ambien 10 mg oral tablet: 1 tab(s) orally once a day (at bedtime) (21 Aug 2024 00:44)  gemfibrozil 600 mg oral tablet: 1 tab(s) orally 2 times a day (21 Aug 2024 00:45)  Levothroid 125 mcg (0.125 mg) oral tablet: 1 tab(s) orally (21 Aug 2024 00:43)  omega-3 polyunsaturated fatty acids:  (21 Aug 2024 00:49)  Synthroid 125 mcg (0.125 mg) oral tablet: 1 tab(s) orally once a day (21 Aug 2024 00:41)      Current (Non-Endocrine) Meds:  acetaminophen     Tablet .. 650 milliGRAM(s) Oral every 6 hours PRN  chlorhexidine 2% Cloths 1 Application(s) Topical <User Schedule>  dextrose 5% + sodium chloride 0.9%. 1000 milliLiter(s) IV Continuous <Continuous>  dextrose 5%. 1000 milliLiter(s) IV Continuous <Continuous>  heparin   Injectable 5000 Unit(s) SubCutaneous every 8 hours  ibuprofen  Tablet. 600 milliGRAM(s) Oral every 8 hours PRN  omega-3-Acid Ethyl Esters 4 Gram(s) Oral daily  ondansetron Injectable 4 milliGRAM(s) IV Push every 6 hours PRN  pantoprazole    Tablet 40 milliGRAM(s) Oral before breakfast      Current Endocrine Meds:   dextrose 50% Injectable 25 milliLiter(s) IV Push every 15 minutes  dextrose 50% Injectable 25 Gram(s) IV Push once  dextrose 50% Injectable 50 milliLiter(s) IV Push every 15 minutes  dextrose Oral Gel 15 Gram(s) Oral once PRN  glucagon  Injectable 1 milliGRAM(s) IntraMuscular once  insulin regular Infusion 5 Unit(s)/Hr IV Continuous <Continuous>  levothyroxine 125 MICROGram(s) Oral daily  metFORMIN 850 milliGRAM(s) Oral two times a day      Allergies:  penicillins (Unknown)  influenza virus vaccine, inactivated (Stomach Upset)  shellfish (Swelling)  Keflex (Swelling; Rash)  IV Contrast (Unknown)      ROS:  Denies the following except as indicated.    General: weight loss/weight gain, decreased appetite, fatigue, fever  Eyes: blurry vision, double vision  ENT: neck swelling, dysphagia, voice changes   CV: palpitations, SOB, chest pain, cough  GI: nausea, vomiting, diarrhea, constipation, abdominal pain  : nocturia,  polyuria, dysuria  Endo: decreased libido, heat/cold intolerance, jitteriness  MSK: arthralgias, myalgias  Skin: rash, dryness, diaphoresis  Neuro: pedal numbness,pedal paresthesias, pedal pain    Height (cm): 167.6 (08-21 @ 13:29), 170.2 (08-20 @ 00:45)  Weight (kg): 102.4 (08-21 @ 13:29), 72.6 (08-20 @ 00:45)  BMI (kg/m2): 36.5 (08-21 @ 13:29), 25.1 (08-20 @ 00:45)    Vital Signs Last 24 Hrs  T(C): 35.9 (22 Aug 2024 11:00), Max: 36.7 (21 Aug 2024 19:01)  T(F): 96.6 (22 Aug 2024 11:00), Max: 98 (21 Aug 2024 19:01)  HR: 74 (22 Aug 2024 11:01) (65 - 97)  BP: 105/68 (22 Aug 2024 11:01) (90/50 - 142/81)  BP(mean): 81 (22 Aug 2024 11:01) (64 - 104)  RR: 18 (22 Aug 2024 11:01) (16 - 20)  SpO2: 97% (22 Aug 2024 11:01) (93% - 98%)    Parameters below as of 22 Aug 2024 07:01  Patient On (Oxygen Delivery Method): room air      Constitutional: WN/WD in NAD.   Neck: no thyromegaly or palpable thyroid nodules   Respiratory: lungs CTAB.  Cardiovascular: regular rate and rhythm, normal S1 and S2, no audible murmurs  GI: soft, NT/ND, no masses/HSM appreciated.  Ext: no edema, no ulcers, pedal pulses palpable bilaterally  Neurology: no tremor, monofilament sensation intact in feet  Psychiatric: A&O x 3, normal affect/mood.        LABS:                        12.1   5.35  )-----------( 151      ( 22 Aug 2024 06:34 )             34.0     08-22    128<L>  |  98  |  6<L>  ----------------------------<  188<H>  3.1<L>   |  21  |  <0.5<L>    Ca    8.1<L>      22 Aug 2024 06:34  Phos  2.2     08-21  Mg     1.8     08-22    TPro  3.2<L>  /  Alb  2.9<L>  /  TBili  <0.2  /  DBili  x   /  AST  5   /  ALT  5   /  AlkPhos  88  08-22      Urinalysis Basic - ( 22 Aug 2024 06:34 )    Color: x / Appearance: x / SG: x / pH: x  Gluc: 188 mg/dL / Ketone: x  / Bili: x / Urobili: x   Blood: x / Protein: x / Nitrite: x   Leuk Esterase: x / RBC: x / WBC x   Sq Epi: x / Non Sq Epi: x / Bacteria: x                             Thyroid Stimulating Hormone, Serum: 55.83 (08-21 @ 07:26)      Triiodothyronine, Total (T3 Total): 20 ng/dL (08-21-24 @ 07:26)      RADIOLOGY & ADDITIONAL STUDIES:    A/P:58yFemale    1.  DM  For now:  Glargine-    units at bedtime  Lispro-    units three times a day before meals   Will continue to monitor     At discharge, recommend:      Pt can follow up at discharge with:    Above discussed with resident.

## 2024-08-23 ENCOUNTER — TRANSCRIPTION ENCOUNTER (OUTPATIENT)
Age: 59
End: 2024-08-23

## 2024-08-23 VITALS — RESPIRATION RATE: 18 BRPM | HEART RATE: 74 BPM | SYSTOLIC BLOOD PRESSURE: 133 MMHG | DIASTOLIC BLOOD PRESSURE: 75 MMHG

## 2024-08-23 DIAGNOSIS — E11.9 TYPE 2 DIABETES MELLITUS WITHOUT COMPLICATIONS: ICD-10-CM

## 2024-08-23 LAB
ALBUMIN SERPL ELPH-MCNC: 3.1 G/DL — LOW (ref 3.5–5.2)
ALP SERPL-CCNC: 89 U/L — SIGNIFICANT CHANGE UP (ref 30–115)
ALT FLD-CCNC: <5 U/L — SIGNIFICANT CHANGE UP (ref 0–41)
ANION GAP SERPL CALC-SCNC: 12 MMOL/L — SIGNIFICANT CHANGE UP (ref 7–14)
AST SERPL-CCNC: 55 U/L — HIGH (ref 0–41)
BILIRUB SERPL-MCNC: 0.2 MG/DL — SIGNIFICANT CHANGE UP (ref 0.2–1.2)
BUN SERPL-MCNC: 5 MG/DL — LOW (ref 10–20)
CALCIUM SERPL-MCNC: 8.1 MG/DL — LOW (ref 8.4–10.5)
CHLORIDE SERPL-SCNC: 96 MMOL/L — LOW (ref 98–110)
CO2 SERPL-SCNC: 21 MMOL/L — SIGNIFICANT CHANGE UP (ref 17–32)
CREAT SERPL-MCNC: <0.5 MG/DL — LOW (ref 0.7–1.5)
EGFR: 136 ML/MIN/1.73M2 — SIGNIFICANT CHANGE UP
GLUCOSE BLDC GLUCOMTR-MCNC: 216 MG/DL — HIGH (ref 70–99)
GLUCOSE BLDC GLUCOMTR-MCNC: 229 MG/DL — HIGH (ref 70–99)
GLUCOSE BLDC GLUCOMTR-MCNC: 288 MG/DL — HIGH (ref 70–99)
GLUCOSE BLDC GLUCOMTR-MCNC: 294 MG/DL — HIGH (ref 70–99)
GLUCOSE SERPL-MCNC: 256 MG/DL — HIGH (ref 70–99)
MAGNESIUM SERPL-MCNC: 1.7 MG/DL — LOW (ref 1.8–2.4)
MELD SCORE WITH DIALYSIS: SIGNIFICANT CHANGE UP POINTS
MELD SCORE WITHOUT DIALYSIS: SIGNIFICANT CHANGE UP POINTS
OSMOLALITY SERPL: 293 MOS/KG — SIGNIFICANT CHANGE UP (ref 275–300)
PHOSPHATE SERPL-MCNC: 3.7 MG/DL — SIGNIFICANT CHANGE UP (ref 2.1–4.9)
POTASSIUM SERPL-MCNC: 4 MMOL/L — SIGNIFICANT CHANGE UP (ref 3.5–5)
POTASSIUM SERPL-SCNC: 4 MMOL/L — SIGNIFICANT CHANGE UP (ref 3.5–5)
PROT SERPL-MCNC: 4.7 G/DL — LOW (ref 6–8)
SODIUM SERPL-SCNC: 129 MMOL/L — LOW (ref 135–146)
TRIGL SERPL-MCNC: 4284 MG/DL — HIGH
TROPONIN T, HIGH SENSITIVITY RESULT: <6 NG/L — SIGNIFICANT CHANGE UP (ref 6–13)

## 2024-08-23 PROCEDURE — 93010 ELECTROCARDIOGRAM REPORT: CPT

## 2024-08-23 PROCEDURE — 99239 HOSP IP/OBS DSCHRG MGMT >30: CPT

## 2024-08-23 PROCEDURE — 99233 SBSQ HOSP IP/OBS HIGH 50: CPT

## 2024-08-23 RX ORDER — TRAMADOL HYDROCHLORIDE 200 MG/1
50 TABLET, EXTENDED RELEASE ORAL ONCE
Refills: 0 | Status: DISCONTINUED | OUTPATIENT
Start: 2024-08-23 | End: 2024-08-23

## 2024-08-23 RX ORDER — METFORMIN HYDROCHLORIDE 850 MG/1
1 TABLET, FILM COATED ORAL
Qty: 60 | Refills: 0
Start: 2024-08-23 | End: 2024-09-21

## 2024-08-23 RX ORDER — CRANBERRY FRUIT EXTRACT 650 MG
2 CAPSULE ORAL
Qty: 120 | Refills: 0
Start: 2024-08-23 | End: 2024-09-21

## 2024-08-23 RX ORDER — LEVOTHYROXINE SODIUM 100 MCG
1 TABLET ORAL
Refills: 0 | DISCHARGE

## 2024-08-23 RX ORDER — MAGNESIUM, ALUMINUM HYDROXIDE 200-225/5
30 SUSPENSION, ORAL (FINAL DOSE FORM) ORAL ONCE
Refills: 0 | Status: COMPLETED | OUTPATIENT
Start: 2024-08-23 | End: 2024-08-23

## 2024-08-23 RX ORDER — ASPIRIN 81 MG
324 TABLET, DELAYED RELEASE (ENTERIC COATED) ORAL ONCE
Refills: 0 | Status: COMPLETED | OUTPATIENT
Start: 2024-08-23 | End: 2024-08-23

## 2024-08-23 RX ORDER — BENZOCAINE .06; .7 ML/1; ML/1
0 SWAB TOPICAL
Qty: 100 | Refills: 1
Start: 2024-08-23

## 2024-08-23 RX ORDER — FENOFIBRATE 145 MG/1
1 TABLET, FILM COATED ORAL
Qty: 30 | Refills: 0
Start: 2024-08-23 | End: 2024-09-21

## 2024-08-23 RX ORDER — INSULIN GLARGINE 100 [IU]/ML
60 INJECTION, SOLUTION SUBCUTANEOUS
Qty: 1 | Refills: 0
Start: 2024-08-23 | End: 2024-09-21

## 2024-08-23 RX ORDER — PIOGLITAZONE 30 MG/1
1 TABLET ORAL
Qty: 30 | Refills: 0
Start: 2024-08-23 | End: 2024-09-21

## 2024-08-23 RX ORDER — CRANBERRY FRUIT EXTRACT 650 MG
0 CAPSULE ORAL
Refills: 0 | DISCHARGE

## 2024-08-23 RX ORDER — LEVOTHYROXINE SODIUM 100 MCG
1 TABLET ORAL
Qty: 30 | Refills: 0
Start: 2024-08-23 | End: 2024-09-21

## 2024-08-23 RX ORDER — GEMFIBROZIL 600 MG
1 TABLET ORAL
Refills: 0 | DISCHARGE

## 2024-08-23 RX ORDER — ICOSAPENT ETHYL 1 G/1
2 CAPSULE, LIQUID FILLED ORAL
Qty: 120 | Refills: 0
Start: 2024-08-23 | End: 2024-09-21

## 2024-08-23 RX ADMIN — Medication 30 MILLILITER(S): at 02:58

## 2024-08-23 RX ADMIN — Medication 324 MILLIGRAM(S): at 03:20

## 2024-08-23 RX ADMIN — Medication 25 GRAM(S): at 08:39

## 2024-08-23 RX ADMIN — Medication 3: at 11:52

## 2024-08-23 RX ADMIN — CHLORHEXIDINE GLUCONATE 1 APPLICATION(S): 40 SOLUTION TOPICAL at 06:38

## 2024-08-23 RX ADMIN — Medication 3: at 16:35

## 2024-08-23 RX ADMIN — Medication 2 GRAM(S): at 06:51

## 2024-08-23 RX ADMIN — ONDANSETRON 4 MILLIGRAM(S): 2 INJECTION, SOLUTION INTRAMUSCULAR; INTRAVENOUS at 02:31

## 2024-08-23 RX ADMIN — INSULIN GLARGINE 30 UNIT(S): 100 INJECTION, SOLUTION SUBCUTANEOUS at 07:44

## 2024-08-23 RX ADMIN — TRAMADOL HYDROCHLORIDE 50 MILLIGRAM(S): 200 TABLET, EXTENDED RELEASE ORAL at 03:20

## 2024-08-23 RX ADMIN — Medication 200 MICROGRAM(S): at 06:38

## 2024-08-23 RX ADMIN — TRAMADOL HYDROCHLORIDE 50 MILLIGRAM(S): 200 TABLET, EXTENDED RELEASE ORAL at 04:25

## 2024-08-23 RX ADMIN — METFORMIN HYDROCHLORIDE 850 MILLIGRAM(S): 850 TABLET, FILM COATED ORAL at 06:38

## 2024-08-23 RX ADMIN — Medication 40 MILLIGRAM(S): at 06:37

## 2024-08-23 RX ADMIN — Medication 2: at 07:46

## 2024-08-23 RX ADMIN — ONDANSETRON 4 MILLIGRAM(S): 2 INJECTION, SOLUTION INTRAMUSCULAR; INTRAVENOUS at 11:55

## 2024-08-23 NOTE — DISCHARGE NOTE PROVIDER - NSDCMRMEDTOKEN_GEN_ALL_CORE_FT
Ambien 10 mg oral tablet: 1 tab(s) orally once a day (at bedtime)   alcohol swabs: Apply topically to affected area 4 times a day  Ambien 10 mg oral tablet: 1 tab(s) orally once a day (at bedtime)  atorvastatin 40 mg oral tablet: 1 tab(s) orally once a day (at bedtime)  fenofibrate 145 mg oral tablet: 1 tab(s) orally once a day (at bedtime)  Fish Oil 1000 mg oral capsule: 2 cap(s) orally 2 times a day  glucometer (per patient&#x27;s insurance): Test blood sugars four times a day. Dispense #1 glucometer.  Insulin Pen Needles, 4mm: 1 application subcutaneously 4 times a day. ** Use with insulin pen **  lancets: 1 application subcutaneously 4 times a day  levothyroxine 200 mcg (0.2 mg) oral tablet: 1 tab(s) orally once a day  metFORMIN 850 mg oral tablet: 1 tab(s) orally 2 times a day  pioglitazone 30 mg oral tablet: 1 tab(s) orally once a day  test strips (per patient&#x27;s insurance): 1 application subcutaneously 4 times a day. ** Compatible with patient&#x27;s glucometer **  Toujeo Max SoloStar 300 units/mL subcutaneous solution: 60 unit(s) subcutaneous once a day (at bedtime)  Vascepa 1 g oral capsule: 2 cap(s) orally 2 times a day   Ambien 10 mg oral tablet: 1 tab(s) orally once a day (at bedtime)  atorvastatin 40 mg oral tablet: 1 tab(s) orally once a day (at bedtime)  fenofibrate 145 mg oral tablet: 1 tab(s) orally once a day (at bedtime)  Fish Oil 1000 mg oral capsule: 2 cap(s) orally 2 times a day  levothyroxine 200 mcg (0.2 mg) oral tablet: 1 tab(s) orally once a day  metFORMIN 850 mg oral tablet: 1 tab(s) orally 2 times a day  pioglitazone 30 mg oral tablet: 1 tab(s) orally once a day  Toujeo Max SoloStar 300 units/mL subcutaneous solution: 60 unit(s) subcutaneous once a day (at bedtime)  Vascepa 1 g oral capsule: 2 cap(s) orally 2 times a day

## 2024-08-23 NOTE — DISCHARGE NOTE NURSING/CASE MANAGEMENT/SOCIAL WORK - PATIENT PORTAL LINK FT
You can access the FollowMyHealth Patient Portal offered by [facility] by registering at the following website: [email or web address]. By joining [employer]’s FollowMyHealth portal, you will also be able to view your health information using other applications (apps) compatible with our system.

## 2024-08-23 NOTE — PROGRESS NOTE ADULT - REASON FOR ADMISSION
severe hypertriglyceridemia

## 2024-08-23 NOTE — DISCHARGE NOTE PROVIDER - CARE PROVIDER_API CALL
Luca Pompa  Endocrinology/Metab/Diabetes  [address] [place] [address]  Phone: [phone]  Fax: [phone]  Follow Up Time: 1 week

## 2024-08-23 NOTE — PROVIDER CONTACT NOTE (OTHER) - ASSESSMENT
Pt c/o heartburn, and nausea. was given Zofran and Maalox. Then c/o Chest pain 7/10. VS: HR 68, 142/88, 16, 96% on RA.

## 2024-08-23 NOTE — DISCHARGE NOTE PROVIDER - HOSPITAL COURSE
58-year-old female With past medical history diabetes, HLD, hypothyroidism. She came to the hospital complaining of abdominal pain patient found to have severe hypertriglyceridemia (>6000) . patient denies alcohol abuse.  Pt claims to be compliant to meds. Patient started on IV insulin in ER.  admitted to ICU for further management. She had no pancreatitis, no end-organ damage, no symptoms. She wasn't improving on the insulin drip so endocrinology consulted, decided to stop insulin drip as long as the patient is asymptomatic and adjust her home regimen and send her home.     the endocrinology recommendations in details: try 1000 kcal diet, no fruit juices, for uncontrolled type 2 diabetes, metformin 850 twice daily, and pioglitazone 30 mg. daily, at least on discharge if pioglitazone is not on formulary, for hyperlipidemia, fish oil capsules 2 g. twice daily here, and generic vascepa 2 g twice daily at discharge, fenofibrate 145 mg. daily, first dose stat, atorvastatin 40 mg. daily first dose stat and daily after that, patient must stop gemfibrozil at home, patient has been taking and filling l-thyroxine 125 mcg daily at home, so change l-thyroxine to 200 mcg. daily. stop intravenous insulin, change to glargine 30 units twice daily, lispro 15 units twice daily, at discharge toujeo max solostar as basal insulin, needs meter test strips, and lancets,, needs endocrine follow up 2-3 weeks.    Will discharge home on the new regimen with close follow up with endocrinology next week 58-year-old female With past medical history diabetes, HLD, hypothyroidism. She came to the hospital complaining of abdominal pain patient found to have severe hypertriglyceridemia (>6000) . patient denies alcohol abuse.  Pt claims to be compliant to meds. Patient started on IV insulin in ER.  admitted to ICU for further management. She had no pancreatitis, no end-organ damage, no symptoms. She wasn't improving on the insulin drip so endocrinology consulted, decided to stop insulin drip as long as the patient is asymptomatic and adjust her home regimen and send her home.     the endocrinology recommendations in details: try 1000 kcal diet, no fruit juices, for uncontrolled type 2 diabetes, metformin 850 twice daily, and pioglitazone 30 mg. daily, at least on discharge if pioglitazone is not on formulary, for hyperlipidemia, fish oil capsules 2 g. twice daily here, and generic vascepa 2 g twice daily at discharge, fenofibrate 145 mg. daily, first dose stat, atorvastatin 40 mg. daily first dose stat and daily after that, patient must stop gemfibrozil at home, patient has been taking and filling l-thyroxine 125 mcg daily at home, so change l-thyroxine to 200 mcg. daily. stop intravenous insulin, change to glargine 30 units twice daily, lispro 15 units twice daily, at discharge toujeo max solostar as basal insulin, needs meter test strips, and lancets,, needs endocrine follow up 2-3 weeks.    Will discharge home on the new regimen with close follow up with endocrinology next week

## 2024-08-23 NOTE — PROGRESS NOTE ADULT - SUBJECTIVE AND OBJECTIVE BOX
Patient seen and evaluated this am, tolerating oral diet, no complaints       T(F): 97.5 (08-23-24 @ 07:05), Max: 97.5 (08-22-24 @ 15:00)  HR: 74 (08-23-24 @ 07:10)  BP: 133/75 (08-23-24 @ 07:10)  RR: 18 (08-23-24 @ 07:10)  SpO2: 98% (08-23-24 @ 03:25) (95% - 98%)    PHYSICAL EXAM:  GENERAL: NAD  HEAD:  Atraumatic, Normocephalic  EYES: EOMI, PERRLA, conjunctiva and sclera clear  NERVOUS SYSTEM:  Alert & Oriented X3, no focal deficits   CHEST/LUNG: Clear to percussion bilaterally; No rales, rhonchi, wheezing, or rubs  HEART: Regular rate and rhythm; No murmurs, rubs, or gallops  ABDOMEN: Soft, Nontender, Nondistended; Bowel sounds present  EXTREMITIES:  2+ Peripheral Pulses, No clubbing, cyanosis, or edema    LABS  08-23    129<L>  |  96<L>  |  5<L>  ----------------------------<  256<H>  4.0   |  21  |  <0.5<L>    Ca    8.1<L>      23 Aug 2024 05:34  Phos  3.7     08-23  Mg     1.7     08-23    TPro  4.7<L>  /  Alb  3.1<L>  /  TBili  0.2  /  DBili  x   /  AST  55<H>  /  ALT  <5  /  AlkPhos  89  08-23  Lipase (08.20.24 @ 21:05)   Lipase: 34: Hemolyzed. Interpret with caution U/L  Triglycerides, Serum in AM (08.23.24 @ 05:34)   Triglycerides, Serum: 6664 -> 4284                      12.1   5.35  )-----------( 151      ( 22 Aug 2024 06:34 )             34.0         CARDIAC ENZYMES    CKMB Units: 1.9 (08-21 @ 02:16)    RADIOLOGY  < from: Xray Chest 1 View- PORTABLE-Urgent (08.20.24 @ 21:48) >  Impression:    No radiographic evidence of acute cardiopulmonary disease.    < end of copied text >  < from: CT Abdomen and Pelvis w/ IV Cont (08.19.24 @ 23:26) >    IMPRESSION:    No CT evidence of an acute intra-abdominal abnormality.      < end of copied text >    MEDICATIONS  (STANDING):  atorvastatin 40 milliGRAM(s) Oral at bedtime  chlorhexidine 2% Cloths 1 Application(s) Topical <User Schedule>  enoxaparin Injectable 40 milliGRAM(s) SubCutaneous every 24 hours  fenofibrate Tablet 145 milliGRAM(s) Oral at bedtime  insulin glargine Injectable (LANTUS) 30 Unit(s) SubCutaneous two times a day  insulin lispro (ADMELOG) corrective regimen sliding scale   SubCutaneous Before meals and at bedtime  levothyroxine 200 MICROGram(s) Oral daily  metFORMIN 850 milliGRAM(s) Oral two times a day  omega-3-Acid Ethyl Esters 2 Gram(s) Oral two times a day  pantoprazole    Tablet 40 milliGRAM(s) Oral before breakfast    MEDICATIONS  (PRN):  dextrose Oral Gel 15 Gram(s) Oral once PRN Blood Glucose LESS THAN 70 milliGRAM(s)/deciliter  ibuprofen  Tablet. 600 milliGRAM(s) Oral every 8 hours PRN Mild Pain (1 - 3)  ondansetron Injectable 4 milliGRAM(s) IV Push every 6 hours PRN Nausea and/or Vomiting    
Reason for Endocrinology Consult: Diabetes    HPI: 58y Female      PAST MEDICAL & SURGICAL HISTORY:  Gallbladder calculus with acute cholecystitis      Pancreatitis      Seizures      Hypothyroid      Panic disorder      History of diverticulitis      Type 2 diabetes mellitus      Obese      S/P cholecystectomy        FAMILY HISTORY:      SH:  Smoking  Etoh:  Recreational Drugs:    Home Medications:  Ambien 10 mg oral tablet: 1 tab(s) orally once a day (at bedtime) (21 Aug 2024 00:44)  gemfibrozil 600 mg oral tablet: 1 tab(s) orally 2 times a day (21 Aug 2024 00:45)  Levothroid 125 mcg (0.125 mg) oral tablet: 1 tab(s) orally (21 Aug 2024 00:43)  omega-3 polyunsaturated fatty acids:  (21 Aug 2024 00:49)  Synthroid 125 mcg (0.125 mg) oral tablet: 1 tab(s) orally once a day (21 Aug 2024 00:41)      Current (Non-Endocrine) Meds:  chlorhexidine 2% Cloths 1 Application(s) Topical <User Schedule>  dextrose 5%. 1000 milliLiter(s) IV Continuous <Continuous>  enoxaparin Injectable 40 milliGRAM(s) SubCutaneous every 24 hours  ibuprofen  Tablet. 600 milliGRAM(s) Oral every 8 hours PRN  magnesium sulfate  IVPB 2 Gram(s) IV Intermittent once  omega-3-Acid Ethyl Esters 2 Gram(s) Oral two times a day  ondansetron Injectable 4 milliGRAM(s) IV Push every 6 hours PRN  pantoprazole    Tablet 40 milliGRAM(s) Oral before breakfast      Current Endocrine Meds:   atorvastatin 40 milliGRAM(s) Oral at bedtime  dextrose 50% Injectable 25 milliLiter(s) IV Push every 15 minutes  dextrose 50% Injectable 25 Gram(s) IV Push once  dextrose 50% Injectable 50 milliLiter(s) IV Push every 15 minutes  dextrose Oral Gel 15 Gram(s) Oral once PRN  fenofibrate Tablet 145 milliGRAM(s) Oral at bedtime  glucagon  Injectable 1 milliGRAM(s) IntraMuscular once  insulin glargine Injectable (LANTUS) 30 Unit(s) SubCutaneous two times a day  insulin lispro (ADMELOG) corrective regimen sliding scale   SubCutaneous Before meals and at bedtime  levothyroxine 200 MICROGram(s) Oral daily  metFORMIN 850 milliGRAM(s) Oral two times a day      Allergies:  penicillins (Unknown)  influenza virus vaccine, inactivated (Stomach Upset)  shellfish (Swelling)  Keflex (Swelling; Rash)  IV Contrast (Unknown)      ROS:  Denies the following except as indicated.    General: weight loss/weight gain, decreased appetite, fatigue, fever  Eyes: blurry vision, double vision  ENT: neck swelling, dysphagia, voice changes   CV: palpitations, SOB, chest pain, cough  GI: nausea, vomiting, diarrhea, constipation, abdominal pain  : nocturia,  polyuria, dysuria  Endo: decreased libido, heat/cold intolerance, jitteriness  MSK: arthralgias, myalgias  Skin: rash, dryness, diaphoresis  Neuro: pedal numbness,pedal paresthesias, pedal pain    Height (cm): 167.6 (08-21 @ 13:29), 170.2 (08-20 @ 00:45)  Weight (kg): 102.4 (08-21 @ 13:29), 72.6 (08-20 @ 00:45)  BMI (kg/m2): 36.5 (08-21 @ 13:29), 25.1 (08-20 @ 00:45)    Vital Signs Last 24 Hrs  T(C): 36.4 (23 Aug 2024 07:05), Max: 36.4 (22 Aug 2024 15:00)  T(F): 97.5 (23 Aug 2024 07:05), Max: 97.5 (22 Aug 2024 15:00)  HR: 74 (23 Aug 2024 07:10) (67 - 82)  BP: 133/75 (23 Aug 2024 07:10) (105/68 - 142/88)  BP(mean): 98 (23 Aug 2024 07:10) (81 - 109)  RR: 18 (23 Aug 2024 07:10) (18 - 20)  SpO2: 98% (23 Aug 2024 03:25) (95% - 98%)    Parameters below as of 23 Aug 2024 03:25  Patient On (Oxygen Delivery Method): room air      Constitutional: WN/WD in NAD.   Neck: no thyromegaly or palpable thyroid nodules   Respiratory: lungs CTAB.  Cardiovascular: regular rate and rhythm, normal S1 and S2, no audible murmurs  GI: soft, NT/ND, no masses/HSM appreciated.  Ext: no edema, no ulcers, pedal pulses palpable bilaterally  Neurology: no tremor, monofilament sensation intact in feet  Psychiatric: A&O x 3, normal affect/mood.        LABS:                        12.1   5.35  )-----------( 151      ( 22 Aug 2024 06:34 )             34.0     08-23    129<L>  |  96<L>  |  5<L>  ----------------------------<  256<H>  4.0   |  21  |  <0.5<L>    Ca    8.1<L>      23 Aug 2024 05:34  Phos  3.7     08-23  Mg     1.7     08-23    TPro  4.7<L>  /  Alb  3.1<L>  /  TBili  0.2  /  DBili  x   /  AST  55<H>  /  ALT  <5  /  AlkPhos  89  08-23      Urinalysis Basic - ( 23 Aug 2024 05:34 )    Color: x / Appearance: x / SG: x / pH: x  Gluc: 256 mg/dL / Ketone: x  / Bili: x / Urobili: x   Blood: x / Protein: x / Nitrite: x   Leuk Esterase: x / RBC: x / WBC x   Sq Epi: x / Non Sq Epi: x / Bacteria: x                             Thyroid Stimulating Hormone, Serum: 55.83 (08-21 @ 07:26)      Triiodothyronine, Total (T3 Total): 20 ng/dL (08-21-24 @ 07:26)      RADIOLOGY & ADDITIONAL STUDIES:    A/P:58yFemale      At discharge, recommend:      Pt can follow up at discharge with:    Above discussed with resident.
 Patient is a 58y old  Female who presents with a chief complaint of severe hypertriglyceridemia (22 Aug 2024 11:55)      T(F): 97.5 (08-22-24 @ 15:00), Max: 98 (08-21-24 @ 19:01)  HR: 80 (08-22-24 @ 14:00)  BP: 122/86 (08-22-24 @ 14:00)  RR: 18 (08-22-24 @ 15:00)  SpO2: 98% (08-22-24 @ 14:00) (93% - 98%)    PHYSICAL EXAM:  GENERAL: NAD  HEAD:  Atraumatic, Normocephalic  EYES: EOMI, PERRLA, conjunctiva and sclera clear  NERVOUS SYSTEM:  Alert & Oriented X3, no focal deficits   CHEST/LUNG: Clear to percussion bilaterally; No rales, rhonchi, wheezing, or rubs  HEART: Regular rate and rhythm; No murmurs, rubs, or gallops  ABDOMEN: Soft, Nontender, Nondistended; Bowel sounds present  EXTREMITIES:  2+ Peripheral Pulses, No clubbing, cyanosis, or edema    LABS  08-22    129<L>  |  97<L>  |  <3<L>  ----------------------------<  180<H>  4.0   |  20  |  <0.5<L>    Ca    8.4      22 Aug 2024 16:23  Phos  2.2     08-21  Mg     1.8     08-22    TPro  3.2<L>  /  Alb  2.9<L>  /  TBili  <0.2  /  DBili  x   /  AST  5   /  ALT  5   /  AlkPhos  88  08-22    Thyroid Stimulating Hormone, Serum (08.21.24 @ 07:26)   Thyroid Stimulating Hormone, Serum: 55.83 uIU/mL      Lipase (08.20.24 @ 21:05)   Lipase: 34: Hemolyzed. Interpret with caution U/L      Triglycerides, Serum (08.22.24 @ 16:23)   Triglycerides, Serum: 6090 mg/dL                      12.1   5.35  )-----------( 151      ( 22 Aug 2024 06:34 )             34.0         CARDIAC ENZYMES    CKMB Units: 1.9 (08-21 @ 02:16)      RADIOLOGY  < from: CT Abdomen and Pelvis w/ IV Cont (08.19.24 @ 23:26) >  IMPRESSION:    No CT evidence of an acute intra-abdominal abnormality.      < end of copied text >    MEDICATIONS  (STANDING):  atorvastatin 40 milliGRAM(s) Oral at bedtime  chlorhexidine 2% Cloths 1 Application(s) Topical <User Schedule>  enoxaparin Injectable 40 milliGRAM(s) SubCutaneous every 24 hours  fenofibrate Tablet 145 milliGRAM(s) Oral at bedtime  insulin glargine Injectable (LANTUS) 30 Unit(s) SubCutaneous two times a day  insulin lispro (ADMELOG) corrective regimen sliding scale   SubCutaneous Before meals and at bedtime  metFORMIN 850 milliGRAM(s) Oral two times a day  omega-3-Acid Ethyl Esters 2 Gram(s) Oral two times a day  pantoprazole    Tablet 40 milliGRAM(s) Oral before breakfast    MEDICATIONS  (PRN):  dextrose Oral Gel 15 Gram(s) Oral once PRN Blood Glucose LESS THAN 70 milliGRAM(s)/deciliter  ibuprofen  Tablet. 600 milliGRAM(s) Oral every 8 hours PRN Mild Pain (1 - 3)  ondansetron Injectable 4 milliGRAM(s) IV Push every 6 hours PRN Nausea and/or Vomiting    
Patient is a 58y old  Female who presents with a chief complaint of severe hypertriglyceridemia (22 Aug 2024 18:01)      Over Night Events:  Patient seen and examined.   feel good no sob cough or wheeze   VS stable endo note reviewed     ROS:  See HPI    PHYSICAL EXAM    ICU Vital Signs Last 24 Hrs  T(C): 35.8 (22 Aug 2024 23:22), Max: 36.4 (22 Aug 2024 15:00)  T(F): 96.4 (22 Aug 2024 23:22), Max: 97.5 (22 Aug 2024 15:00)  HR: 67 (23 Aug 2024 03:25) (67 - 82)  BP: 142/88 (23 Aug 2024 03:25) (95/71 - 142/88)  BP(mean): 109 (23 Aug 2024 03:25) (78 - 109)  ABP: --  ABP(mean): --  RR: 18 (23 Aug 2024 03:25) (18 - 20)  SpO2: 98% (23 Aug 2024 03:25) (95% - 98%)    O2 Parameters below as of 23 Aug 2024 03:25  Patient On (Oxygen Delivery Method): room air            General: AOx3  HEENT:    mary            Lymph Nodes: NO cervical LN   Lungs: Bilateral BS  Cardiovascular: Regular   Abdomen: Soft, Positive BS  Extremities: No clubbing   Skin: warm   Neurological: no focal   Musculoskeletal: move all ext     I&O's Detail    22 Aug 2024 07:01  -  23 Aug 2024 07:00  --------------------------------------------------------  IN:    dextrose 5% + sodium chloride 0.9%: 900 mL    Insulin: 84 mL    Oral Fluid: 640 mL    sodium chloride 0.9%: 200 mL  Total IN: 1824 mL    OUT:  Total OUT: 0 mL    Total NET: 1824 mL          LABS:                          12.1   5.35  )-----------( 151      ( 22 Aug 2024 06:34 )             34.0         22 Aug 2024 16:23    129    |  97     |  <3     ----------------------------<  180    4.0     |  20     |  <0.5     Ca    8.4        22 Aug 2024 16:23  Phos  2.2       21 Aug 2024 07:26  Mg     1.8       22 Aug 2024 06:34                                                                                      Urinalysis Basic - ( 22 Aug 2024 16:23 )    Color: x / Appearance: x / SG: x / pH: x  Gluc: 180 mg/dL / Ketone: x  / Bili: x / Urobili: x   Blood: x / Protein: x / Nitrite: x   Leuk Esterase: x / RBC: x / WBC x   Sq Epi: x / Non Sq Epi: x / Bacteria: x        Lactate, Blood: 1.8 mmol/L (08-20-24 @ 21:05)                                                                                                                                               MEDICATIONS  (STANDING):  atorvastatin 40 milliGRAM(s) Oral at bedtime  chlorhexidine 2% Cloths 1 Application(s) Topical <User Schedule>  dextrose 5%. 1000 milliLiter(s) (50 mL/Hr) IV Continuous <Continuous>  dextrose 50% Injectable 25 milliLiter(s) IV Push every 15 minutes  dextrose 50% Injectable 50 milliLiter(s) IV Push every 15 minutes  dextrose 50% Injectable 25 Gram(s) IV Push once  enoxaparin Injectable 40 milliGRAM(s) SubCutaneous every 24 hours  fenofibrate Tablet 145 milliGRAM(s) Oral at bedtime  glucagon  Injectable 1 milliGRAM(s) IntraMuscular once  insulin glargine Injectable (LANTUS) 30 Unit(s) SubCutaneous two times a day  insulin lispro (ADMELOG) corrective regimen sliding scale   SubCutaneous Before meals and at bedtime  levothyroxine 200 MICROGram(s) Oral daily  metFORMIN 850 milliGRAM(s) Oral two times a day  omega-3-Acid Ethyl Esters 2 Gram(s) Oral two times a day  pantoprazole    Tablet 40 milliGRAM(s) Oral before breakfast    MEDICATIONS  (PRN):  dextrose Oral Gel 15 Gram(s) Oral once PRN Blood Glucose LESS THAN 70 milliGRAM(s)/deciliter  ibuprofen  Tablet. 600 milliGRAM(s) Oral every 8 hours PRN Mild Pain (1 - 3)  ondansetron Injectable 4 milliGRAM(s) IV Push every 6 hours PRN Nausea and/or Vomiting          Xrays:  TLC:  OG:  ET tube:                                                                                       ECHO:  CAM ICU:

## 2024-08-23 NOTE — DISCHARGE NOTE PROVIDER - NSDCCPCAREPLAN_GEN_ALL_CORE_FT
PRINCIPAL DISCHARGE DIAGNOSIS  Diagnosis: Hypertriglyceridemia  Assessment and Plan of Treatment: You were found to have very high triglycerides, endocrinology was consulted and new medications will be prescribed to control it. Please follow up with him closely to continue monitoring and treating the high triglycerides.  Persistantly high triglycerides might cause inflammation of the pancreas, problems with vision and rarely strokes. It also increases susceptability for arterial diseases such as heart attacks and vascular diseases.

## 2024-08-23 NOTE — PROGRESS NOTE ADULT - ASSESSMENT
58 year old female past medical history of Diabetes, Elevated Triglycerides patient comes to emergency room for 3 days of  headache, intermittent chest pain and burning epigastric abdominal pain. Patient seen in ED yesterday diagnosed with severe hypertriglyceridemia in the 6000's no pancreatitis troponin negative plan was for admission and gemfibrozil 600 with IV fluids.  Patient left AMA to get care for her dog at home.  Patient returns now admits to continued symptoms no new symptoms.    hypertriglyceridemia / uncontrolled DM / hypothyroid     - pt was on IV Insulin   - a1c is 12   - change meds to oral and sq as per endocrinology consult    - DC planning
IMPRESSION:  Pt is a 58 Y o female with PMH of diverticulitis Hypothyroid, obese, pancreatitis, panic disorder, seizure, DM now admitted  for     #isolated hypertriglyceridemia 6600s ? familial??  #hyponatremia corrected with in normal   #Hypokalemia resolved   #HAGMA   #Severe Hypothyroidism TSH 55  #Uncontrolled DM  Hgb A1c of 12        PLAN:    CNS: At baseline, mentating well    HEENT: Oral care    PULMONARY:  HOB @ 45 degrees.  Aspiration precautions O2 as needed, ARIAN work up as OP     CARDIOVASCULAR: : Keep Map> 65, HR controlled     GI: low fat diet   dietitian consult     RENAL:  Follow up lytes.  Correct as needed, hydration    INFECTIOUS DISEASE: Follow up cultures,     HEMATOLOGICAL:  DVT prophylaxis.    ENDOCRINE:  off insulin drip as per endo   on lantus , metformin , fenofibrate , zocor   levothyroxin   follow endo   follo FS   follow TG level   MUSCULOSKELETAL: OOB to chair     downgrade to floor off insulin drip     
58 year old female past medical history of Diabetes, Elevated Triglycerides patient comes to emergency room for 3 days of  headache, intermittent chest pain and burning epigastric abdominal pain. Patient seen in ED yesterday diagnosed with severe hypertriglyceridemia in the 6000's no pancreatitis troponin negative plan was for admission and gemfibrozil 600 with IV fluids.  Patient left AMA to get care for her dog at home.  Patient returns now admits to continued symptoms no new symptoms.    hypertriglyceridemia / uncontrolled DM / hypothyroid     - IV Insulin -> basal insulin toujeo max solostar once daily as per endo   - synthroid 200mcg daily,    - metformin [id number], atorvastatin 40mg qhs, pioglitazone 30mg daily, fenofibrate 145mg daily, Vascepa 2gm q12h    - may DC home today with outpt endocrinology f/u in 2-3 weeks   - a1c is 12   - 33min spent on DC  
obesity, type 2 diabetes, hyperlipidemia, and primary hypothyroidism, patient when stable, can go home on basal insulin toujeo max solostar once a day as well as al the other meds in my previous note, the triglicerides will continue to drop in the future, will stop mealtime insulin at discharge, endo follow up next week.

## 2024-08-23 NOTE — PROVIDER CONTACT NOTE (OTHER) - ACTION/TREATMENT ORDERED:
12 lead ECG done, negative, troponin send to lab, negative.  mg and Ultram 50 mg was ad as per orders.

## 2024-08-24 RX ORDER — LEVOTHYROXINE SODIUM 100 MCG
1 TABLET ORAL
Qty: 30 | Refills: 0
Start: 2024-08-24 | End: 2024-09-22

## 2024-08-24 RX ORDER — ICOSAPENT ETHYL 1 G/1
2 CAPSULE, LIQUID FILLED ORAL
Qty: 120 | Refills: 0
Start: 2024-08-24 | End: 2024-09-22

## 2024-08-24 RX ORDER — PIOGLITAZONE 30 MG/1
1 TABLET ORAL
Qty: 30 | Refills: 0
Start: 2024-08-24 | End: 2024-09-22

## 2024-08-24 RX ORDER — FENOFIBRATE 145 MG/1
1 TABLET, FILM COATED ORAL
Qty: 30 | Refills: 0
Start: 2024-08-24 | End: 2024-09-22

## 2024-08-24 RX ORDER — INSULIN GLARGINE 100 [IU]/ML
60 INJECTION, SOLUTION SUBCUTANEOUS
Qty: 1 | Refills: 0
Start: 2024-08-24 | End: 2024-09-22

## 2024-08-24 RX ORDER — BENZOCAINE .06; .7 ML/1; ML/1
0 SWAB TOPICAL
Qty: 100 | Refills: 1
Start: 2024-08-24

## 2024-08-24 RX ORDER — CRANBERRY FRUIT EXTRACT 650 MG
2 CAPSULE ORAL
Qty: 120 | Refills: 0
Start: 2024-08-24 | End: 2024-09-22

## 2024-08-24 RX ORDER — METFORMIN HYDROCHLORIDE 850 MG/1
1 TABLET, FILM COATED ORAL
Qty: 60 | Refills: 0
Start: 2024-08-24 | End: 2024-09-22

## 2024-08-24 RX ORDER — CRANBERRY FRUIT EXTRACT 650 MG
2 CAPSULE ORAL
Qty: 60 | Refills: 0
Start: 2024-08-24 | End: 2024-09-07

## 2024-08-27 RX ORDER — INSULIN GLARGINE 100 [IU]/ML
60 INJECTION, SOLUTION SUBCUTANEOUS
Qty: 1 | Refills: 0
Start: 2024-08-27 | End: 2024-09-25

## 2024-08-29 DIAGNOSIS — Z88.0 ALLERGY STATUS TO PENICILLIN: ICD-10-CM

## 2024-08-29 DIAGNOSIS — Z79.890 HORMONE REPLACEMENT THERAPY: ICD-10-CM

## 2024-08-29 DIAGNOSIS — E03.9 HYPOTHYROIDISM, UNSPECIFIED: ICD-10-CM

## 2024-08-29 DIAGNOSIS — Z91.013 ALLERGY TO SEAFOOD: ICD-10-CM

## 2024-08-29 DIAGNOSIS — F41.0 PANIC DISORDER [EPISODIC PAROXYSMAL ANXIETY]: ICD-10-CM

## 2024-08-29 DIAGNOSIS — E11.9 TYPE 2 DIABETES MELLITUS WITHOUT COMPLICATIONS: ICD-10-CM

## 2024-08-29 DIAGNOSIS — Z88.7 ALLERGY STATUS TO SERUM AND VACCINE: ICD-10-CM

## 2024-08-29 DIAGNOSIS — Z91.041 RADIOGRAPHIC DYE ALLERGY STATUS: ICD-10-CM

## 2024-08-29 DIAGNOSIS — E83.42 HYPOMAGNESEMIA: ICD-10-CM

## 2024-08-29 DIAGNOSIS — Z88.1 ALLERGY STATUS TO OTHER ANTIBIOTIC AGENTS: ICD-10-CM

## 2024-08-29 DIAGNOSIS — E87.1 HYPO-OSMOLALITY AND HYPONATREMIA: ICD-10-CM

## 2024-08-29 DIAGNOSIS — E87.6 HYPOKALEMIA: ICD-10-CM

## 2024-08-29 DIAGNOSIS — R56.9 UNSPECIFIED CONVULSIONS: ICD-10-CM

## 2024-08-29 DIAGNOSIS — E78.1 PURE HYPERGLYCERIDEMIA: ICD-10-CM

## 2024-08-29 DIAGNOSIS — E66.01 MORBID (SEVERE) OBESITY DUE TO EXCESS CALORIES: ICD-10-CM

## 2025-06-29 ENCOUNTER — HOSPITAL ENCOUNTER (EMERGENCY)
Facility: HOSPITAL | Age: 60
Discharge: HOME | End: 2025-06-29
Attending: EMERGENCY MEDICINE
Payer: COMMERCIAL

## 2025-06-29 VITALS
RESPIRATION RATE: 25 BRPM | OXYGEN SATURATION: 94 % | DIASTOLIC BLOOD PRESSURE: 105 MMHG | TEMPERATURE: 97.5 F | HEART RATE: 83 BPM | SYSTOLIC BLOOD PRESSURE: 184 MMHG

## 2025-06-29 DIAGNOSIS — R10.12 ABDOMINAL PAIN, LEFT UPPER QUADRANT: Primary | ICD-10-CM

## 2025-06-29 DIAGNOSIS — F41.1 ANXIETY STATE: ICD-10-CM

## 2025-06-29 LAB
ALBUMIN SERPL-MCNC: 3.9 G/DL (ref 3.5–5)
ALBUMIN/GLOB SERPL: 1 (ref 1.1–2.2)
ALP SERPL-CCNC: 94 U/L (ref 45–117)
ALT SERPL-CCNC: 58 U/L (ref 12–78)
ANION GAP SERPL CALC-SCNC: 6 MMOL/L (ref 2–12)
APPEARANCE UR: CLEAR
AST SERPL-CCNC: 35 U/L (ref 15–37)
BACTERIA URNS QL MICRO: NEGATIVE /HPF
BASOPHILS # BLD: 0.06 K/UL (ref 0–0.1)
BASOPHILS NFR BLD: 0.9 % (ref 0–1)
BILIRUB SERPL-MCNC: 0.4 MG/DL (ref 0.2–1)
BILIRUB UR QL: NEGATIVE
BUN SERPL-MCNC: 11 MG/DL (ref 6–20)
BUN/CREAT SERPL: 8 (ref 12–20)
CALCIUM SERPL-MCNC: 9.5 MG/DL (ref 8.5–10.1)
CHLORIDE SERPL-SCNC: 106 MMOL/L (ref 97–108)
CO2 SERPL-SCNC: 24 MMOL/L (ref 21–32)
COLOR UR: NORMAL
CREAT SERPL-MCNC: 1.34 MG/DL (ref 0.55–1.02)
DIFFERENTIAL METHOD BLD: NORMAL
EKG ATRIAL RATE: 58 BPM
EKG DIAGNOSIS: NORMAL
EKG P AXIS: 61 DEGREES
EKG P-R INTERVAL: 196 MS
EKG Q-T INTERVAL: 458 MS
EKG QRS DURATION: 90 MS
EKG QTC CALCULATION (BAZETT): 449 MS
EKG R AXIS: 43 DEGREES
EKG T AXIS: 62 DEGREES
EKG VENTRICULAR RATE: 58 BPM
EOSINOPHIL # BLD: 0.08 K/UL (ref 0–0.4)
EOSINOPHIL NFR BLD: 1.2 % (ref 0–7)
EPITH CASTS URNS QL MICRO: NORMAL /LPF
ERYTHROCYTE [DISTWIDTH] IN BLOOD BY AUTOMATED COUNT: 13.9 % (ref 11.5–14.5)
GLOBULIN SER CALC-MCNC: 4.1 G/DL (ref 2–4)
GLUCOSE SERPL-MCNC: 127 MG/DL (ref 65–100)
GLUCOSE UR STRIP.AUTO-MCNC: NEGATIVE MG/DL
HCT VFR BLD AUTO: 37.5 % (ref 35–47)
HGB BLD-MCNC: 12.5 G/DL (ref 11.5–16)
HGB UR QL STRIP: NEGATIVE
HYALINE CASTS URNS QL MICRO: NORMAL /LPF (ref 0–2)
IMM GRANULOCYTES # BLD AUTO: 0.02 K/UL (ref 0–0.04)
IMM GRANULOCYTES NFR BLD AUTO: 0.3 % (ref 0–0.5)
KETONES UR QL STRIP.AUTO: NEGATIVE MG/DL
LEUKOCYTE ESTERASE UR QL STRIP.AUTO: NEGATIVE
LIPASE SERPL-CCNC: 40 U/L (ref 13–75)
LYMPHOCYTES # BLD: 1.99 K/UL (ref 0.8–3.5)
LYMPHOCYTES NFR BLD: 29.6 % (ref 12–49)
MCH RBC QN AUTO: 32.4 PG (ref 26–34)
MCHC RBC AUTO-ENTMCNC: 33.3 G/DL (ref 30–36.5)
MCV RBC AUTO: 97.2 FL (ref 80–99)
MONOCYTES # BLD: 0.47 K/UL (ref 0–1)
MONOCYTES NFR BLD: 7 % (ref 5–13)
NEUTS SEG # BLD: 4.11 K/UL (ref 1.8–8)
NEUTS SEG NFR BLD: 61 % (ref 32–75)
NITRITE UR QL STRIP.AUTO: NEGATIVE
NRBC # BLD: 0 K/UL (ref 0–0.01)
NRBC BLD-RTO: 0 PER 100 WBC
PH UR STRIP: 5.5 (ref 5–8)
PLATELET # BLD AUTO: 191 K/UL (ref 150–400)
PMV BLD AUTO: 10.9 FL (ref 8.9–12.9)
POTASSIUM SERPL-SCNC: 3.8 MMOL/L (ref 3.5–5.1)
PROT SERPL-MCNC: 8 G/DL (ref 6.4–8.2)
PROT UR STRIP-MCNC: NEGATIVE MG/DL
RBC # BLD AUTO: 3.86 M/UL (ref 3.8–5.2)
RBC #/AREA URNS HPF: NORMAL /HPF (ref 0–5)
SODIUM SERPL-SCNC: 136 MMOL/L (ref 136–145)
SP GR UR REFRACTOMETRY: 1.01
TROPONIN I SERPL HS-MCNC: <4 NG/L (ref 0–51)
URINE CULTURE IF INDICATED: NORMAL
UROBILINOGEN UR QL STRIP.AUTO: 0.2 EU/DL (ref 0.2–1)
WBC # BLD AUTO: 6.7 K/UL (ref 3.6–11)
WBC URNS QL MICRO: NORMAL /HPF (ref 0–4)

## 2025-06-29 PROCEDURE — 99284 EMERGENCY DEPT VISIT MOD MDM: CPT

## 2025-06-29 PROCEDURE — 81001 URINALYSIS AUTO W/SCOPE: CPT

## 2025-06-29 PROCEDURE — 93005 ELECTROCARDIOGRAM TRACING: CPT | Performed by: EMERGENCY MEDICINE

## 2025-06-29 PROCEDURE — 6370000000 HC RX 637 (ALT 250 FOR IP): Performed by: EMERGENCY MEDICINE

## 2025-06-29 PROCEDURE — 96375 TX/PRO/DX INJ NEW DRUG ADDON: CPT

## 2025-06-29 PROCEDURE — 83690 ASSAY OF LIPASE: CPT

## 2025-06-29 PROCEDURE — 80053 COMPREHEN METABOLIC PANEL: CPT

## 2025-06-29 PROCEDURE — 84484 ASSAY OF TROPONIN QUANT: CPT

## 2025-06-29 PROCEDURE — 85025 COMPLETE CBC W/AUTO DIFF WBC: CPT

## 2025-06-29 RX ORDER — PHENAZOPYRIDINE HYDROCHLORIDE 100 MG/1
200 TABLET, FILM COATED ORAL ONCE
Status: COMPLETED | OUTPATIENT
Start: 2025-06-29 | End: 2025-06-29

## 2025-06-29 RX ORDER — ALPRAZOLAM 0.5 MG
0.5 TABLET ORAL
Status: COMPLETED | OUTPATIENT
Start: 2025-06-29 | End: 2025-06-29

## 2025-06-29 RX ORDER — ONDANSETRON 2 MG/ML
4 INJECTION INTRAMUSCULAR; INTRAVENOUS EVERY 6 HOURS PRN
Status: DISCONTINUED | OUTPATIENT
Start: 2025-06-29 | End: 2025-06-29 | Stop reason: HOSPADM

## 2025-06-29 RX ADMIN — ALPRAZOLAM 0.5 MG: 0.5 TABLET ORAL at 09:55

## 2025-06-29 RX ADMIN — PHENAZOPYRIDINE 200 MG: 100 TABLET ORAL at 11:37

## 2025-06-29 ASSESSMENT — PAIN DESCRIPTION - PAIN TYPE
TYPE: ACUTE PAIN
TYPE: ACUTE PAIN

## 2025-06-29 ASSESSMENT — PAIN DESCRIPTION - FREQUENCY: FREQUENCY: CONTINUOUS

## 2025-06-29 ASSESSMENT — PAIN DESCRIPTION - DESCRIPTORS
DESCRIPTORS: CRAMPING
DESCRIPTORS: PRESSURE

## 2025-06-29 ASSESSMENT — PAIN DESCRIPTION - ORIENTATION
ORIENTATION: MID
ORIENTATION: MID

## 2025-06-29 ASSESSMENT — PAIN - FUNCTIONAL ASSESSMENT
PAIN_FUNCTIONAL_ASSESSMENT: 0-10
PAIN_FUNCTIONAL_ASSESSMENT: ACTIVITIES ARE NOT PREVENTED

## 2025-06-29 ASSESSMENT — LIFESTYLE VARIABLES
HOW OFTEN DO YOU HAVE A DRINK CONTAINING ALCOHOL: NEVER
HOW MANY STANDARD DRINKS CONTAINING ALCOHOL DO YOU HAVE ON A TYPICAL DAY: PATIENT DOES NOT DRINK

## 2025-06-29 ASSESSMENT — PAIN DESCRIPTION - ONSET: ONSET: ON-GOING

## 2025-06-29 ASSESSMENT — PAIN DESCRIPTION - LOCATION
LOCATION: CHEST
LOCATION: ABDOMEN;CHEST

## 2025-06-29 ASSESSMENT — PAIN SCALES - GENERAL: PAINLEVEL_OUTOF10: 8

## 2025-06-29 NOTE — DISCHARGE INSTRUCTIONS
You are seen emergency department for your symptoms.  Please follow-up with your primary care doctor.  Return for any worsening symptoms anytime.

## 2025-06-29 NOTE — ED NOTES
This RN has reviewed discharge instructions with the patient at this time. patient has been made aware of prescription(s) called into pharmacy on file for , follow up care, and diagnoses care instructions. The Patient verbalized understanding and denies any further questions. VSS and pt AOx4. Patient ambulatory out to waiting room at this time for hospital to home

## 2025-06-29 NOTE — ED PROVIDER NOTES
[facility] EMERGENCY DEPARTMENT  EMERGENCY DEPARTMENT ENCOUNTER       Pt Name: Dian Herring  MRN: [id number]  Birthdate 1965  Date of evaluation: 6/29/2025  Provider: Ketan Shea MD   PCP: No primary care provider on file.  Note Started: 9:46 AM EDT 6/29/25     CHIEF COMPLAINT       Chief Complaint   Patient presents with    Abdominal Pain     Pt BIBEMS from a hotel with reports of LUQ abd pain that started this morning with N/V and radiates into chest. Pt states she drank a lot of espresso and \"never drinks coffee\". Pt is traveling from [place] back to [place]. Pt denies sob. Pt takes monjouro. Pt states she feels extremely anxious and is out of her xanax.         HISTORY OF PRESENT ILLNESS: 1 or more elements      History From: patient, History limited by: none     Dian Herring is a 59 y.o. female presents to the emergency department via EMS with a chief complaint of abdominal pain.    Patient is a history of anxiety and diabetes.  She is on Mounjaro and take Xanax nightly.  Patient reports that this morning she woke up and ordered a decaf coffee however she is concerned that it had \"caffeine in it\" as following this patient reports her heart began to race and she began to feel anxious.    She reports this is associate with a left upper quadrant pain that radiates to her bladder as well as chest.  Patient is concerned this may be a urinary tract infection.  Denies shortness of breath.  Did take ODT Zofran with some improvement.  Patient primarily endorsing anxiety.       Please See MDM for Additional Details of the HPI/PMH  Nursing Notes were all reviewed and agreed with or any disagreements were addressed in the HPI.     REVIEW OF SYSTEMS        Positives and Pertinent negatives as per HPI.    PAST HISTORY     Past Medical History:  No past medical history on file.    Past Surgical History:  No past surgical history on file.    Family History:  No family history on file.    Social History:

## 2025-06-30 ENCOUNTER — HOSPITAL ENCOUNTER (EMERGENCY)
Facility: HOSPITAL | Age: 60
Discharge: HOME | End: 2025-06-30
Attending: EMERGENCY MEDICINE
Payer: COMMERCIAL

## 2025-06-30 VITALS
HEIGHT: 66 IN | OXYGEN SATURATION: 94 % | TEMPERATURE: 97.3 F | HEART RATE: 63 BPM | WEIGHT: 211.2 LBS | BODY MASS INDEX: 33.94 KG/M2 | DIASTOLIC BLOOD PRESSURE: 79 MMHG | RESPIRATION RATE: 15 BRPM | SYSTOLIC BLOOD PRESSURE: 113 MMHG

## 2025-06-30 DIAGNOSIS — R07.89 ATYPICAL CHEST PAIN: ICD-10-CM

## 2025-06-30 DIAGNOSIS — F41.1 ANXIETY STATE: Primary | ICD-10-CM

## 2025-06-30 DIAGNOSIS — G47.00 INSOMNIA, UNSPECIFIED TYPE: ICD-10-CM

## 2025-06-30 LAB
ALBUMIN SERPL-MCNC: 4.1 G/DL (ref 3.5–5)
ALBUMIN/GLOB SERPL: 1.2 (ref 1.1–2.2)
ALP SERPL-CCNC: 90 U/L (ref 45–117)
ALT SERPL-CCNC: 59 U/L (ref 12–78)
ANION GAP SERPL CALC-SCNC: 7 MMOL/L (ref 2–12)
AST SERPL-CCNC: 41 U/L (ref 15–37)
BASOPHILS # BLD: 0.03 K/UL (ref 0–0.1)
BASOPHILS NFR BLD: 0.4 % (ref 0–1)
BILIRUB SERPL-MCNC: 0.4 MG/DL (ref 0.2–1)
BUN SERPL-MCNC: 10 MG/DL (ref 6–20)
BUN/CREAT SERPL: 8 (ref 12–20)
CALCIUM SERPL-MCNC: 9 MG/DL (ref 8.5–10.1)
CHLORIDE SERPL-SCNC: 105 MMOL/L (ref 97–108)
CO2 SERPL-SCNC: 25 MMOL/L (ref 21–32)
CREAT SERPL-MCNC: 1.29 MG/DL (ref 0.55–1.02)
DIFFERENTIAL METHOD BLD: ABNORMAL
EOSINOPHIL # BLD: 0.03 K/UL (ref 0–0.4)
EOSINOPHIL NFR BLD: 0.4 % (ref 0–7)
ERYTHROCYTE [DISTWIDTH] IN BLOOD BY AUTOMATED COUNT: 13.9 % (ref 11.5–14.5)
GLOBULIN SER CALC-MCNC: 3.3 G/DL (ref 2–4)
GLUCOSE SERPL-MCNC: 135 MG/DL (ref 65–100)
HCT VFR BLD AUTO: 35.8 % (ref 35–47)
HGB BLD-MCNC: 11.9 G/DL (ref 11.5–16)
IMM GRANULOCYTES # BLD AUTO: 0.02 K/UL (ref 0–0.04)
IMM GRANULOCYTES NFR BLD AUTO: 0.3 % (ref 0–0.5)
LYMPHOCYTES # BLD: 2.15 K/UL (ref 0.8–3.5)
LYMPHOCYTES NFR BLD: 27.6 % (ref 12–49)
MCH RBC QN AUTO: 32.4 PG (ref 26–34)
MCHC RBC AUTO-ENTMCNC: 33.2 G/DL (ref 30–36.5)
MCV RBC AUTO: 97.5 FL (ref 80–99)
MONOCYTES # BLD: 0.53 K/UL (ref 0–1)
MONOCYTES NFR BLD: 6.8 % (ref 5–13)
NEUTS SEG # BLD: 5.04 K/UL (ref 1.8–8)
NEUTS SEG NFR BLD: 64.5 % (ref 32–75)
NRBC # BLD: 0 K/UL (ref 0–0.01)
NRBC BLD-RTO: 0 PER 100 WBC
PLATELET # BLD AUTO: 193 K/UL (ref 150–400)
PMV BLD AUTO: 10.9 FL (ref 8.9–12.9)
POTASSIUM SERPL-SCNC: 3.7 MMOL/L (ref 3.5–5.1)
PROT SERPL-MCNC: 7.4 G/DL (ref 6.4–8.2)
RBC # BLD AUTO: 3.67 M/UL (ref 3.8–5.2)
SODIUM SERPL-SCNC: 137 MMOL/L (ref 136–145)
TROPONIN I SERPL HS-MCNC: 4 NG/L (ref 0–51)
WBC # BLD AUTO: 7.8 K/UL (ref 3.6–11)

## 2025-06-30 PROCEDURE — 84484 ASSAY OF TROPONIN QUANT: CPT

## 2025-06-30 PROCEDURE — 36415 COLL VENOUS BLD VENIPUNCTURE: CPT

## 2025-06-30 PROCEDURE — 6370000000 HC RX 637 (ALT 250 FOR IP): Performed by: EMERGENCY MEDICINE

## 2025-06-30 PROCEDURE — 6360000002 HC RX W HCPCS: Performed by: EMERGENCY MEDICINE

## 2025-06-30 PROCEDURE — 80053 COMPREHEN METABOLIC PANEL: CPT

## 2025-06-30 PROCEDURE — 96375 TX/PRO/DX INJ NEW DRUG ADDON: CPT

## 2025-06-30 PROCEDURE — 6360000002 HC RX W HCPCS

## 2025-06-30 PROCEDURE — 96374 THER/PROPH/DIAG INJ IV PUSH: CPT

## 2025-06-30 PROCEDURE — 85025 COMPLETE CBC W/AUTO DIFF WBC: CPT

## 2025-06-30 RX ORDER — ONDANSETRON 2 MG/ML
INJECTION INTRAMUSCULAR; INTRAVENOUS
Status: COMPLETED
Start: 2025-06-30 | End: 2025-06-30

## 2025-06-30 RX ORDER — HYDROXYZINE HYDROCHLORIDE 25 MG/1
25 TABLET, FILM COATED ORAL EVERY 8 HOURS PRN
Qty: 30 TABLET | Refills: 0 | Status: SHIPPED | OUTPATIENT
Start: 2025-06-30 | End: 2025-07-10

## 2025-06-30 RX ORDER — DIAZEPAM 10 MG/2ML
5 INJECTION, SOLUTION INTRAMUSCULAR; INTRAVENOUS ONCE
Status: COMPLETED | OUTPATIENT
Start: 2025-06-30 | End: 2025-06-30

## 2025-06-30 RX ORDER — DIAZEPAM 2 MG/1
2 TABLET ORAL ONCE
Status: COMPLETED | OUTPATIENT
Start: 2025-06-30 | End: 2025-06-30

## 2025-06-30 RX ORDER — ONDANSETRON 2 MG/ML
4 INJECTION INTRAMUSCULAR; INTRAVENOUS ONCE
Status: COMPLETED | OUTPATIENT
Start: 2025-06-30 | End: 2025-06-30

## 2025-06-30 RX ORDER — FAMOTIDINE 20 MG/1
20 TABLET, FILM COATED ORAL
Status: COMPLETED | OUTPATIENT
Start: 2025-06-30 | End: 2025-06-30

## 2025-06-30 RX ORDER — ONDANSETRON 4 MG/1
4 TABLET, ORALLY DISINTEGRATING ORAL ONCE
Status: COMPLETED | OUTPATIENT
Start: 2025-06-30 | End: 2025-06-30

## 2025-06-30 RX ADMIN — DIAZEPAM 5 MG: 5 INJECTION, SOLUTION INTRAMUSCULAR; INTRAVENOUS at 01:30

## 2025-06-30 RX ADMIN — ONDANSETRON 4 MG: 2 INJECTION INTRAMUSCULAR; INTRAVENOUS at 01:35

## 2025-06-30 RX ADMIN — ONDANSETRON 4 MG: 2 INJECTION, SOLUTION INTRAMUSCULAR; INTRAVENOUS at 01:35

## 2025-06-30 RX ADMIN — DIAZEPAM 2 MG: 2 TABLET ORAL at 04:17

## 2025-06-30 RX ADMIN — ONDANSETRON 4 MG: 4 TABLET, ORALLY DISINTEGRATING ORAL at 04:16

## 2025-06-30 RX ADMIN — FAMOTIDINE 20 MG: 20 TABLET, FILM COATED ORAL at 04:17

## 2025-06-30 ASSESSMENT — LIFESTYLE VARIABLES
HOW MANY STANDARD DRINKS CONTAINING ALCOHOL DO YOU HAVE ON A TYPICAL DAY: PATIENT DECLINED
HOW OFTEN DO YOU HAVE A DRINK CONTAINING ALCOHOL: PATIENT DECLINED

## 2025-06-30 NOTE — ED PROVIDER NOTES
[facility] EMERGENCY DEPARTMENT  EMERGENCY DEPARTMENT ENCOUNTER         Pt Name: Dian Herring  MRN: [id number]  Birthdate 1965  Date of evaluation: 6/29/2025  Provider: Freda Mcpherson MD   PCP: No primary care provider on file.  Note Started: 4:07 AM 6/30/25     CHIEF COMPLAINT       Chief Complaint   Patient presents with    Anxiety     Patient reports her anxiety is really bad, she said she was discharged around 2100     Chest Pain     Patient reports since being discharged patient has begun experiencing chest pain        HISTORY OF PRESENT ILLNESS: 1 or more elements      History From: Patient  HPI Limitations: None     Dian Herring is a 59 y.o. female whose medical history is listed below presents   No other symptoms or concerns and ROS is otherwise negative.   Pt denies any other exacerbating or ameliorating factors. There are no other complaints, changes or physical findings pertinent to the HPI at this time.     Severe anxiety and panic attacs  Emir seen earlier today, another hospital prior to that  On abx for uti     Independent Historian  Clinical information obtained from an independent historian. History obtained from or confirmed by  and is as documented above in hpi and below in mdm.     PAST HISTORY     Past Medical History:  No past medical history on file.    Past Surgical History:  No past surgical history on file.    Family History:  No family history on file.    Social History:       Allergies:  Allergies   Allergen Reactions    Ciprofloxacin Hives    Penicillins Other (See Comments)     \"It's on my baby papers\"          MEDICAL RECORDS REVIEW      I reviewed and interpreted the nursing notes and and vital signs from today's visit, as well as the electronic medical record system for any external medical records that were available that may contribute to the patients current condition.  This includes my independent reviewed and interpreted the following medical

## 2025-06-30 NOTE — ED NOTES
Pt to nurses station stating that she is going to the restroom again.  RN validated pt need to use the restroom and advised pt to use the attached restroom of her ER room.  Pt back to nurses station states that she is dry heaving again.  Pt advised that RN will make MD aware and she will come see pt shortly.  RN requested pt to return to room.  
Responded to patient's call light at this time. Patient verbalized concern regarding her diastolic blood pressure measurement and notified RN that her monitor was off. Confirmed with patient that due to discharge disposition, vital sign monitoring was no longer indicated and monitor could be disconnected. Patient stated that the burning sensation was returning to her chest and that she was starting to feel anxious again; patient stated she would not be able to return to her hotel with her current symptoms. Notified patient that primary nurse had contacted the physician in regards to additional doses of medication prior to discharge. Patient verbalized understanding and expressed that her discomfort and anxiety would affect her ability to travel today. Informed patient that the provider would be contacted again to discuss medications. Patient verbalized understanding.    Vida MARTINEZ notified by telephone of patient's concerns. MD to see patient at bedside.  
Declines

## 2025-06-30 NOTE — ED TRIAGE NOTES
Patient ambulatory to triage from waiting room with complaint of anxiety and chest pain. Patient reports being discharged from [facility] around 2100 due to anxiety and a UTI. Patient reports chest pain beginning after leaving [facility], reports vomiting at her hotel. Patient reports she hasn't slept for 48hrs, reports she stopped taking Ambien 24hrs ago.

## 2025-07-02 PROBLEM — Z87.19 PERSONAL HISTORY OF OTHER DISEASES OF THE DIGESTIVE SYSTEM: Chronic | Status: ACTIVE | Noted: 2024-08-21

## 2025-07-02 PROBLEM — E66.9 OBESITY, UNSPECIFIED: Chronic | Status: ACTIVE | Noted: 2024-08-21

## 2025-07-02 PROBLEM — F41.0 PANIC DISORDER [EPISODIC PAROXYSMAL ANXIETY]: Chronic | Status: ACTIVE | Noted: 2024-08-21

## 2025-07-02 PROBLEM — E11.9 TYPE 2 DIABETES MELLITUS WITHOUT COMPLICATIONS: Chronic | Status: ACTIVE | Noted: 2024-08-21

## 2025-07-04 ENCOUNTER — EMERGENCY (EMERGENCY)
Facility: HOSPITAL | Age: 60
LOS: 0 days | Discharge: HOME | End: 2025-07-04
Attending: EMERGENCY MEDICINE
Payer: MEDICAID

## 2025-07-04 VITALS
OXYGEN SATURATION: 99 % | DIASTOLIC BLOOD PRESSURE: 75 MMHG | SYSTOLIC BLOOD PRESSURE: 112 MMHG | HEART RATE: 70 BPM | WEIGHT: 179.9 LBS | RESPIRATION RATE: 18 BRPM | TEMPERATURE: 98 F

## 2025-07-04 VITALS — TEMPERATURE: 98 F | HEART RATE: 60 BPM

## 2025-07-04 DIAGNOSIS — Z88.1 ALLERGY STATUS TO OTHER ANTIBIOTIC AGENTS: ICD-10-CM

## 2025-07-04 DIAGNOSIS — Z88.0 ALLERGY STATUS TO PENICILLIN: ICD-10-CM

## 2025-07-04 DIAGNOSIS — Z88.7 ALLERGY STATUS TO SERUM AND VACCINE: ICD-10-CM

## 2025-07-04 DIAGNOSIS — N39.0 URINARY TRACT INFECTION, SITE NOT SPECIFIED: ICD-10-CM

## 2025-07-04 DIAGNOSIS — E11.9 TYPE 2 DIABETES MELLITUS WITHOUT COMPLICATIONS: ICD-10-CM

## 2025-07-04 DIAGNOSIS — G40.909 EPILEPSY, UNSPECIFIED, NOT INTRACTABLE, WITHOUT STATUS EPILEPTICUS: ICD-10-CM

## 2025-07-04 DIAGNOSIS — K57.92 DIVERTICULITIS OF INTESTINE, PART UNSPECIFIED, WITHOUT PERFORATION OR ABSCESS WITHOUT BLEEDING: ICD-10-CM

## 2025-07-04 DIAGNOSIS — Z91.041 RADIOGRAPHIC DYE ALLERGY STATUS: ICD-10-CM

## 2025-07-04 DIAGNOSIS — Z90.49 ACQUIRED ABSENCE OF OTHER SPECIFIED PARTS OF DIGESTIVE TRACT: Chronic | ICD-10-CM

## 2025-07-04 DIAGNOSIS — E78.5 HYPERLIPIDEMIA, UNSPECIFIED: ICD-10-CM

## 2025-07-04 DIAGNOSIS — E03.9 HYPOTHYROIDISM, UNSPECIFIED: ICD-10-CM

## 2025-07-04 LAB
ALBUMIN SERPL ELPH-MCNC: 3.8 G/DL — SIGNIFICANT CHANGE UP (ref 3.5–5.2)
ALP SERPL-CCNC: 74 U/L — SIGNIFICANT CHANGE UP (ref 30–115)
ALT FLD-CCNC: <5 U/L — SIGNIFICANT CHANGE UP (ref 0–41)
ANION GAP SERPL CALC-SCNC: 12 MMOL/L — SIGNIFICANT CHANGE UP (ref 7–14)
APPEARANCE UR: CLEAR — SIGNIFICANT CHANGE UP
AST SERPL-CCNC: <5 U/L — SIGNIFICANT CHANGE UP (ref 0–41)
BACTERIA # UR AUTO: ABNORMAL /HPF
BASOPHILS # BLD AUTO: 0.06 K/UL — SIGNIFICANT CHANGE UP (ref 0–0.2)
BASOPHILS NFR BLD AUTO: 0.9 % — SIGNIFICANT CHANGE UP (ref 0–2)
BILIRUB SERPL-MCNC: <0.2 MG/DL — SIGNIFICANT CHANGE UP (ref 0.2–1.2)
BILIRUB UR-MCNC: ABNORMAL
BUN SERPL-MCNC: 19 MG/DL — SIGNIFICANT CHANGE UP (ref 10–20)
CALCIUM SERPL-MCNC: 8.4 MG/DL — SIGNIFICANT CHANGE UP (ref 8.4–10.5)
CHLORIDE SERPL-SCNC: 102 MMOL/L — SIGNIFICANT CHANGE UP (ref 98–110)
CO2 SERPL-SCNC: 21 MMOL/L — SIGNIFICANT CHANGE UP (ref 17–32)
COLOR SPEC: ABNORMAL
CREAT SERPL-MCNC: 0.8 MG/DL — SIGNIFICANT CHANGE UP (ref 0.7–1.5)
DIFF PNL FLD: NEGATIVE — SIGNIFICANT CHANGE UP
EGFR: 85 ML/MIN/1.73M2 — SIGNIFICANT CHANGE UP
EGFR: 85 ML/MIN/1.73M2 — SIGNIFICANT CHANGE UP
EOSINOPHIL # BLD AUTO: 0.21 K/UL — SIGNIFICANT CHANGE UP (ref 0–0.5)
EOSINOPHIL NFR BLD AUTO: 3 % — SIGNIFICANT CHANGE UP (ref 0–6)
EPI CELLS # UR: PRESENT
GLUCOSE SERPL-MCNC: 104 MG/DL — HIGH (ref 70–99)
GLUCOSE UR QL: NEGATIVE MG/DL — SIGNIFICANT CHANGE UP
HCT VFR BLD CALC: 34.3 % — LOW (ref 34.5–45)
HGB BLD-MCNC: 12.2 G/DL — SIGNIFICANT CHANGE UP (ref 11.5–15.5)
IMM GRANULOCYTES # BLD AUTO: 0.02 K/UL — SIGNIFICANT CHANGE UP (ref 0–0.07)
IMM GRANULOCYTES NFR BLD AUTO: 0.3 % — SIGNIFICANT CHANGE UP (ref 0–0.9)
KETONES UR QL: NEGATIVE MG/DL — SIGNIFICANT CHANGE UP
LEUKOCYTE ESTERASE UR-ACNC: ABNORMAL
LIDOCAIN IGE QN: 57 U/L — SIGNIFICANT CHANGE UP (ref 7–60)
LYMPHOCYTES # BLD AUTO: 2.77 K/UL — SIGNIFICANT CHANGE UP (ref 1–3.3)
LYMPHOCYTES NFR BLD AUTO: 39.3 % — SIGNIFICANT CHANGE UP (ref 13–44)
MCHC RBC-ENTMCNC: 34.7 PG — HIGH (ref 27–34)
MCHC RBC-ENTMCNC: 35.6 G/DL — SIGNIFICANT CHANGE UP (ref 32–36)
MCV RBC AUTO: 97.4 FL — SIGNIFICANT CHANGE UP (ref 80–100)
MONOCYTES # BLD AUTO: 0.62 K/UL — SIGNIFICANT CHANGE UP (ref 0–0.9)
MONOCYTES NFR BLD AUTO: 8.8 % — SIGNIFICANT CHANGE UP (ref 2–14)
NEUTROPHILS # BLD AUTO: 3.37 K/UL — SIGNIFICANT CHANGE UP (ref 1.8–7.4)
NEUTROPHILS NFR BLD AUTO: 47.7 % — SIGNIFICANT CHANGE UP (ref 43–77)
NITRITE UR-MCNC: POSITIVE
NRBC # BLD AUTO: 0 K/UL — SIGNIFICANT CHANGE UP (ref 0–0)
NRBC # FLD: 0 K/UL — SIGNIFICANT CHANGE UP (ref 0–0)
NRBC BLD AUTO-RTO: 0 /100 WBCS — SIGNIFICANT CHANGE UP (ref 0–0)
PH UR: 5.5 — SIGNIFICANT CHANGE UP (ref 5–8)
PLATELET # BLD AUTO: 182 K/UL — SIGNIFICANT CHANGE UP (ref 150–400)
PMV BLD: 12 FL — SIGNIFICANT CHANGE UP (ref 7–13)
POTASSIUM SERPL-MCNC: 5.2 MMOL/L — HIGH (ref 3.5–5)
POTASSIUM SERPL-SCNC: 5.2 MMOL/L — HIGH (ref 3.5–5)
PROT SERPL-MCNC: 6.7 G/DL — SIGNIFICANT CHANGE UP (ref 6–8)
PROT UR-MCNC: SIGNIFICANT CHANGE UP MG/DL
RBC # BLD: 3.52 M/UL — LOW (ref 3.8–5.2)
RBC # FLD: 13.8 % — SIGNIFICANT CHANGE UP (ref 10.3–14.5)
RBC CASTS # UR COMP ASSIST: 2 /HPF — SIGNIFICANT CHANGE UP (ref 0–4)
SODIUM SERPL-SCNC: 135 MMOL/L — SIGNIFICANT CHANGE UP (ref 135–146)
SP GR SPEC: 1.03 — SIGNIFICANT CHANGE UP (ref 1–1.03)
SQUAMOUS # UR AUTO: 6 /HPF — HIGH (ref 0–5)
UROBILINOGEN FLD QL: 1 MG/DL — SIGNIFICANT CHANGE UP (ref 0.2–1)
WBC # BLD: 7.05 K/UL — SIGNIFICANT CHANGE UP (ref 3.8–10.5)
WBC # FLD AUTO: 7.05 K/UL — SIGNIFICANT CHANGE UP (ref 3.8–10.5)
WBC UR QL: 4 /HPF — SIGNIFICANT CHANGE UP (ref 0–5)

## 2025-07-04 PROCEDURE — 80053 COMPREHEN METABOLIC PANEL: CPT

## 2025-07-04 PROCEDURE — 99285 EMERGENCY DEPT VISIT HI MDM: CPT

## 2025-07-04 PROCEDURE — 93005 ELECTROCARDIOGRAM TRACING: CPT

## 2025-07-04 PROCEDURE — 74176 CT ABD & PELVIS W/O CONTRAST: CPT | Mod: 26

## 2025-07-04 PROCEDURE — 85025 COMPLETE CBC W/AUTO DIFF WBC: CPT

## 2025-07-04 PROCEDURE — 87086 URINE CULTURE/COLONY COUNT: CPT

## 2025-07-04 PROCEDURE — 83690 ASSAY OF LIPASE: CPT

## 2025-07-04 PROCEDURE — 96374 THER/PROPH/DIAG INJ IV PUSH: CPT

## 2025-07-04 PROCEDURE — 93010 ELECTROCARDIOGRAM REPORT: CPT

## 2025-07-04 PROCEDURE — 99285 EMERGENCY DEPT VISIT HI MDM: CPT | Mod: 25

## 2025-07-04 PROCEDURE — 81001 URINALYSIS AUTO W/SCOPE: CPT

## 2025-07-04 PROCEDURE — 74176 CT ABD & PELVIS W/O CONTRAST: CPT

## 2025-07-04 PROCEDURE — 96376 TX/PRO/DX INJ SAME DRUG ADON: CPT

## 2025-07-04 RX ORDER — CIPROFLOXACIN HCL 250 MG
1 TABLET ORAL
Qty: 14 | Refills: 0
Start: 2025-07-04 | End: 2025-07-10

## 2025-07-04 RX ORDER — METRONIDAZOLE 250 MG
500 TABLET ORAL ONCE
Refills: 0 | Status: COMPLETED | OUTPATIENT
Start: 2025-07-04 | End: 2025-07-04

## 2025-07-04 RX ORDER — SODIUM CHLORIDE 9 G/1000ML
1000 INJECTION, SOLUTION INTRAVENOUS ONCE
Refills: 0 | Status: COMPLETED | OUTPATIENT
Start: 2025-07-04 | End: 2025-07-04

## 2025-07-04 RX ORDER — METRONIDAZOLE 250 MG
1 TABLET ORAL
Qty: 14 | Refills: 0
Start: 2025-07-04 | End: 2025-07-10

## 2025-07-04 RX ORDER — CIPROFLOXACIN HCL 250 MG
500 TABLET ORAL ONCE
Refills: 0 | Status: COMPLETED | OUTPATIENT
Start: 2025-07-04 | End: 2025-07-04

## 2025-07-04 RX ADMIN — Medication 2 MILLIGRAM(S): at 11:52

## 2025-07-04 RX ADMIN — Medication 2 MILLIGRAM(S): at 13:34

## 2025-07-04 RX ADMIN — Medication 500 MILLIGRAM(S): at 15:10

## 2025-07-04 RX ADMIN — SODIUM CHLORIDE 1000 MILLILITER(S): 9 INJECTION, SOLUTION INTRAVENOUS at 11:51

## 2025-07-04 RX ADMIN — Medication 500 MILLIGRAM(S): at 15:09

## 2025-07-04 NOTE — ED PROVIDER NOTE - CARE PROVIDERS DIRECT ADDRESSES
,[email or web address],[email or web address]

## 2025-07-04 NOTE — ED PROVIDER NOTE - ATTENDING APP SHARED VISIT CONTRIBUTION OF CARE
59-year-old female with past medical history of diabetes, hyperlipidemia, hypothyroidism, seizure disorder, presents for concern of urinary tract infection.  Patient states she was driving up to [place] last weekend, when she started to feel suprapubic discomfort, felt like she was going to pass out, drove to a hotel and called for an ambulance who brought her to a hospital in [place] had workup done including CT abdomen pelvis and was told she had a urinary tract infection and was prescribed Macrobid but patient states she could not pick it up as she does not know the places in [place].  Patient states she had about 3 episodes in [place] went to the hospital 3 times there.  Patient drove back to [place] on Monday to the 30th, symptoms improved but then returned so came to the emergency department.  Associated symptoms of chills and dysuria.  Pain is throbbing, moderate intensity, radiates to the lower abdomen, no alleviating or precipitating factors.  Patient states that she is allergic to Keflex, anaphylactic, cannot get IV contrast as well, can only get CT without contrast.  No fever,  n/v, cp,  pleuritic cp, sob, palpitations, diaphoresis, cough, ha/lh/dizziness, numbness/tingling, neck pain/ stiffness, diarrhea, constipation, melena/brbpr,  vaginal bleeding/discharge/odor, trauma, weakness, edema, calf pain/swelling/erythema, sick contacts, recent travel or rash.     On Exam: Vital Signs: I have reviewed the initial vital signs. Constitutional: Non toxic appearing pt sitting on stretcher speaking full sentences. Integumentary: No rash. No jaundice. EYES: No sclera Icterus. ENT: MMM NECK: Supple, non-tender, no meningeal signs. Cardiovascular: RRR, radial pulses 2/4 b/l. No JVD. Respiratory: BS present b/l, ctabl, no wheezing or crackles, no accessory muscle use, no stridor. Gastrointestinal: BS present throughout all 4 quadrants, soft, nd, RLQ, suprapubic pain, LLQ, no rebound tenderness or guarding, (+) R  cvat. (-) Browning's (-) Rovsing (-) Obturator (-) Psoas, Musculoskeletal: FROM, no edema, no calf pain/swelling/erythema. Neurologic: AAOx3, motor 5/5 and sensation intact throughout upper and lower ext, CN II-XII intact, No facial droop or slurring of speech. No focal deficits.    Plan: EKG, labs, ivf, pain control, urine, imaging, reassess.

## 2025-07-04 NOTE — ED PROVIDER NOTE - PHYSICAL EXAMINATION
Gen: Alert, NAD, well appearing  Head: NC, AT, PERRL, EOMI, normal lids/conjunctiva  ENT: normal hearing  Neck: +supple, no tenderness/meningismus,  Pulm: Bilateral BS, normal resp effort, no wheeze/stridor/retractions  CV: RRR  Abd: soft, +lower abdominal tenderness. No guarding or rebound. + right CVA tenderness  Mskel: no edema/erythema/cyanosis  Skin: no rash, warm/dry  Neuro: AAOx3, no sensory/motor deficits

## 2025-07-04 NOTE — ED PROVIDER NOTE - CARE PROVIDER_API CALL
your PMD,   Phone: (   )    -  Fax: (   )    -  Follow Up Time: 1-3 Days    Chiqui Saul  Gastroenterology  [address] [place] [address]  Phone: [phone]  Fax: [phone]  Follow Up Time: 1-3 Days

## 2025-07-04 NOTE — ED PROVIDER NOTE - PATIENT PORTAL LINK FT
You can access the FollowMyHealth Patient Portal offered by [facility] by registering at the following website: [email or web address]. By joining [employer]’s FollowMyHealth portal, you will also be able to view your health information using other applications (apps) compatible with our system.

## 2025-07-04 NOTE — ED PROVIDER NOTE - OBJECTIVE STATEMENT
59-year-old female history of high cholesterol, thyroid, seizures complain of abdominal pain.  Patient states she was driving up from [place] last week and developed abdominal pain.  Patient states that pain was severe and made her feel like she was going to pass out.  She went to hospital in [place] and had scans done and was told she had a urinary  tract infection.  Patient states she had to go back to the hospital 3 more times for the pain.  She was given prescription for Macrobid but did not fill prescription.  Patient still with pain.

## 2025-07-04 NOTE — ED ADULT NURSE NOTE - NSFALLUNIVINTERV_ED_ALL_ED
Bed/Stretcher in lowest position, wheels locked, appropriate side rails in place/Call bell, personal items and telephone in reach/Instruct patient to call for assistance before getting out of bed/chair/stretcher/Non-slip footwear applied when patient is off stretcher/[place] to call system/Physically safe environment - no spills, clutter or unnecessary equipment/Purposeful proactive rounding/Room/bathroom lighting operational, light cord in reach

## 2025-07-04 NOTE — ED ADULT NURSE NOTE - IS THE PATIENT ABLE TO BE SCREENED?
PT c/o Low abdomen pain since Friday, seen at [facility] on Saturday and started on Maalox without relief of pain this morning,  Pt denies nausea nor vomiting.  Pt has not got her menses yet.   Is This A New Presentation, Or A Follow-Up?: Follow Up Basal Cell Carcinoma Yes

## 2025-07-04 NOTE — ED PROVIDER NOTE - CLINICAL SUMMARY MEDICAL DECISION MAKING FREE TEXT BOX
59-year-old female with past medical history of diabetes, hyperlipidemia, hypothyroidism, seizure disorder, presents for concern of urinary tract infection.  Patient states she was driving up to [place] last weekend, when she started to feel suprapubic discomfort, felt like she was going to pass out, drove to a hotel and called for an ambulance who brought her to a hospital in [place] had workup done including CT abdomen pelvis and was told she had a urinary tract infection and was prescribed Macrobid but patient states she could not pick it up as she does not know the places in [place].  Patient states she had about 3 episodes in [place] went to the hospital 3 times there.  Patient drove back to [place] on Monday to the 30th, symptoms improved but then returned so came to the emergency department.  Associated symptoms of chills and dysuria.  Pain is throbbing, moderate intensity, radiates to the lower abdomen, no alleviating or precipitating factors.  Patient states that she is allergic to Keflex, anaphylactic, cannot get IV contrast as well, can only get CT without contrast.  No fever,  n/v, cp,  pleuritic cp, sob, palpitations, diaphoresis, cough, ha/lh/dizziness, numbness/tingling, neck pain/ stiffness, diarrhea, constipation, melena/brbpr,  vaginal bleeding/discharge/odor, trauma, weakness, edema, calf pain/swelling/erythema, sick contacts, recent travel or rash.     On Exam: Vital Signs: I have reviewed the initial vital signs. Constitutional: Non toxic appearing pt sitting on stretcher speaking full sentences. Integumentary: No rash. No jaundice. EYES: No sclera Icterus. ENT: MMM NECK: Supple, non-tender, no meningeal signs. Cardiovascular: RRR, radial pulses 2/4 b/l. No JVD. Respiratory: BS present b/l, ctabl, no wheezing or crackles, no accessory muscle use, no stridor. Gastrointestinal: BS present throughout all 4 quadrants, soft, nd, RLQ, suprapubic pain, LLQ, no rebound tenderness or guarding, (+) R  cvat. (-) Browning's (-) Rovsing (-) Obturator (-) Psoas, Musculoskeletal: FROM, no edema, no calf pain/swelling/erythema. Neurologic: AAOx3, motor 5/5 and sensation intact throughout upper and lower ext, CN II-XII intact, No facial droop or slurring of speech. No focal deficits.    Plan: EKG, labs, ivf, pain control, urine, imaging, reassess.    Labs and EKG were ordered and reviewed.  Imaging was ordered and reviewed by me.  Appropriate medications for patient's presenting complaints were ordered and effects were reassessed.  Patient's records (prior hospital, ED visit) were reviewed. 59-year-old female with past medical history of diabetes, hyperlipidemia, hypothyroidism, seizure disorder, presents for concern of urinary tract infection.  Patient states she was driving up to [place] last weekend, when she started to feel suprapubic discomfort, felt like she was going to pass out, drove to a hotel and called for an ambulance who brought her to a hospital in [place] had workup done including CT abdomen pelvis and was told she had a urinary tract infection and was prescribed Macrobid but patient states she could not pick it up as she does not know the places in [place].  Patient states she had about 3 episodes in [place] went to the hospital 3 times there.  Patient drove back to [place] on Monday to the 30th, symptoms improved but then returned so came to the emergency department.  Associated symptoms of chills and dysuria.  Pain is throbbing, moderate intensity, radiates to the lower abdomen, no alleviating or precipitating factors.  Patient states that she is allergic to Keflex, anaphylactic, cannot get IV contrast as well, can only get CT without contrast.  No fever,  n/v, cp,  pleuritic cp, sob, palpitations, diaphoresis, cough, ha/lh/dizziness, numbness/tingling, neck pain/ stiffness, diarrhea, constipation, melena/brbpr,  vaginal bleeding/discharge/odor, trauma, weakness, edema, calf pain/swelling/erythema, sick contacts, recent travel or rash.     On Exam: Vital Signs: I have reviewed the initial vital signs. Constitutional: Non toxic appearing pt sitting on stretcher speaking full sentences. Integumentary: No rash. No jaundice. EYES: No sclera Icterus. ENT: MMM NECK: Supple, non-tender, no meningeal signs. Cardiovascular: RRR, radial pulses 2/4 b/l. No JVD. Respiratory: BS present b/l, ctabl, no wheezing or crackles, no accessory muscle use, no stridor. Gastrointestinal: BS present throughout all 4 quadrants, soft, nd, RLQ, suprapubic pain, LLQ, no rebound tenderness or guarding, (+) R  cvat. (-) Browning's (-) Rovsing (-) Obturator (-) Psoas, Musculoskeletal: FROM, no edema, no calf pain/swelling/erythema. Neurologic: AAOx3, motor 5/5 and sensation intact throughout upper and lower ext, CN II-XII intact, No facial droop or slurring of speech. No focal deficits.    Plan: EKG, labs, ivf, pain control, urine, imaging, reassess.    Labs and EKG were ordered and reviewed.  Imaging was ordered and reviewed by me.  Appropriate medications for patient's presenting complaints were ordered and effects were reassessed.  Patient's records (prior hospital, ED visit) were reviewed. Abdomen reexam soft, nontender, nondistended, no rebound, no guarding, patient aware of urinary tract infection as well as imaging concerning for diverticulitis, patient tolerating p.o., refuses any cephalosporins, had dose of Cipro and Flagyl and tolerated well, urine culture was sent for sensitivity, patient aware of proper diet, symptomatic treatment, proper  hygiene, follow-up with PMD GI, and urogyn, copy of results provided, aware signs and symptoms to return for.  Reports will follow-up. Escalation to admission/observation was considered. However patient feels much better and is comfortable with discharge.  Appropriate follow-up was arranged.  Supportive care and home care discussed in detail. Patient aware they may have to return for re-evaluation and possible admission if outpatient treatment fails. Strict return precautions discussed.

## 2025-07-04 NOTE — ED PROVIDER NOTE - PROVIDER TOKENS
FREE:[LAST:[your PMD],PHONE:[(   )    -],FAX:[(   )    -],FOLLOWUP:[1-3 Days]],PROVIDER:[TOKEN:[[id number]],FOLLOWUP:[1-3 Days]]

## 2025-07-04 NOTE — ED PROVIDER NOTE - DIFFERENTIAL DIAGNOSIS
Differential Diagnosis The differential diagnosis for patients clinical presentation includes but is not limited to:  uti  pyelo  metabolic vs infectious etiology

## 2025-07-04 NOTE — ED PROVIDER NOTE - PROGRESS NOTE DETAILS
ED Attending HARSH Lamb  Abdomen reexam soft, nontender, nondistended, no rebound, no guarding, patient aware of urinary tract infection as well as imaging concerning for diverticulitis, patient tolerating p.o., refuses any cephalosporins, had dose of Cipro and Flagyl and tolerated well, urine culture was sent for sensitivity, patient aware of proper diet, symptomatic treatment, proper  hygiene, follow-up with PMD GI, and urogyn, copy of results provided, aware signs and symptoms to return for.  Reports will follow-up.

## 2025-07-04 NOTE — ED PROVIDER NOTE - NSFOLLOWUPINSTRUCTIONS_ED_ALL_ED_FT
Our Emergency Department Referral Coordinators will be reaching out to you in the next 24-48 hours from 9:00am to 5:00pm to schedule a follow up appointment. Please expect a phone call from the hospital in that time frame. If you do not receive a call or if you have any questions or concerns, you can reach them at [phone]. You are being given a referral to follow up with a GI for your  Diverticulitis.    Diverticulitis    Diverticulitis is inflammation or infection of small pouches in your colon that form when you have a condition called diverticulosis. This condition can range from mild to severe potentially leading to perforation or obstructions of your colon. Symptoms include abdominal pain, fever/chills, nausea, vomiting, diarrhea, constipation, or blood in your stool. If you were prescribed an antibiotic medicine, take it as told by your health care provider. Do not stop taking the antibiotic even if you start to feel better.    SEEK IMMEDIATE MEDICAL CARE IF YOU HAVE THE FOLLOWING SYMPTOMS: worsening abdominal pain, high fever, inability to hold down liquids or medication, black or bloody stools, inability to pass gas, lightheadedness/dizziness, or a change in mental status.      Urinary Tract Infection    A urinary tract infection (UTI) is an infection of any part of the urinary tract, which includes the kidneys, ureters, bladder, and urethra. Risk factors include ignoring your need to urinate, wiping back to front if female, being an uncircumcised male, and having diabetes or a weak immune system. Symptoms include frequent urination, pain or burning with urination, foul smelling urine, cloudy urine, pain in the lower abdomen, blood in the urine, and fever. If you were prescribed an antibiotic medicine, take it as told by your health care provider. Do not stop taking the antibiotic even if you start to feel better.    SEEK IMMEDIATE MEDICAL CARE IF YOU HAVE THE FOLLOWING SYMPTOMS: severe back or abdominal pain, inability to keep fluids or medicine down, dizziness/lightheadedness, or a change in mental status.

## 2025-07-05 LAB
CULTURE RESULTS: SIGNIFICANT CHANGE UP
SPECIMEN SOURCE: SIGNIFICANT CHANGE UP

## 2025-07-08 NOTE — CHART NOTE - NSCHARTNOTEFT_GEN_A_CORE
"[facility] MRN [id number] / Left message 7/7 - JL / Pt wants both gastro & uro-gyn 7/8 - JL / Appointment made - JL / [id number] / DR LETTY WALLACE,SHAJI PLUMMER / 	Tue 11/11/2025 08:20 AM"    SPECIALTY: gastroenterology

## 2025-07-11 ASSESSMENT — ENCOUNTER SYMPTOMS
CHEST TIGHTNESS: 1
COLOR CHANGE: 0
DIARRHEA: 0
NAUSEA: 0
BACK PAIN: 0
COUGH: 0
SHORTNESS OF BREATH: 0
ABDOMINAL PAIN: 0
VOMITING: 0

## 2025-08-14 ENCOUNTER — APPOINTMENT (OUTPATIENT)
Dept: OBGYN | Facility: CLINIC | Age: 60
End: 2025-08-14

## 2025-09-15 ENCOUNTER — APPOINTMENT (OUTPATIENT)
Dept: UROGYNECOLOGY | Facility: CLINIC | Age: 60
End: 2025-09-15